# Patient Record
Sex: FEMALE | Race: WHITE | NOT HISPANIC OR LATINO
[De-identification: names, ages, dates, MRNs, and addresses within clinical notes are randomized per-mention and may not be internally consistent; named-entity substitution may affect disease eponyms.]

---

## 2020-09-17 ENCOUNTER — IMAGING SERVICES (OUTPATIENT)
Dept: OTHER | Age: 60
End: 2020-09-17

## 2021-09-13 ENCOUNTER — IMAGING SERVICES (OUTPATIENT)
Dept: OTHER | Age: 61
End: 2021-09-13

## 2021-12-21 ENCOUNTER — IMAGING SERVICES (OUTPATIENT)
Dept: OTHER | Age: 61
End: 2021-12-21

## 2022-02-02 ENCOUNTER — HOSPITAL ENCOUNTER (OUTPATIENT)
Dept: ULTRASOUND IMAGING | Age: 62
Discharge: HOME | End: 2022-02-02
Attending: OBSTETRICS & GYNECOLOGY

## 2022-02-02 ENCOUNTER — HOSPITAL ENCOUNTER (OUTPATIENT)
Dept: MAMMOGRAPHY | Age: 62
Discharge: HOME | End: 2022-02-02
Attending: OBSTETRICS & GYNECOLOGY

## 2022-02-02 DIAGNOSIS — N63.21 MASS OF UPPER OUTER QUADRANT OF LEFT BREAST: ICD-10-CM

## 2022-02-02 DIAGNOSIS — R59.9 ENLARGED LYMPH NODE: ICD-10-CM

## 2022-02-02 DIAGNOSIS — N63.11 MASS OF UPPER OUTER QUADRANT OF RIGHT BREAST: ICD-10-CM

## 2022-02-02 PROCEDURE — G0279 TOMOSYNTHESIS, MAMMO: HCPCS

## 2022-02-02 PROCEDURE — 76882 US LMTD JT/FCL EVL NVASC XTR: CPT

## 2023-01-27 ENCOUNTER — LAB REQUISITION (OUTPATIENT)
Dept: OBGYN | Age: 63
End: 2023-01-27

## 2023-01-27 DIAGNOSIS — R32 URINARY INCONTINENCE: ICD-10-CM

## 2023-01-27 PROCEDURE — 87088 URINE BACTERIA CULTURE: CPT | Performed by: CLINICAL MEDICAL LABORATORY

## 2023-01-27 PROCEDURE — 87086 URINE CULTURE/COLONY COUNT: CPT | Performed by: CLINICAL MEDICAL LABORATORY

## 2023-01-29 LAB — BACTERIA UR CULT: ABNORMAL

## 2023-02-03 ENCOUNTER — HOSPITAL ENCOUNTER (OUTPATIENT)
Dept: CT IMAGING | Age: 63
Discharge: HOME | End: 2023-02-03
Attending: OBSTETRICS & GYNECOLOGY

## 2023-02-03 DIAGNOSIS — Z12.31 VISIT FOR SCREENING MAMMOGRAM: ICD-10-CM

## 2023-02-03 PROCEDURE — 77063 BREAST TOMOSYNTHESIS BI: CPT

## 2023-02-27 ENCOUNTER — HOSPITAL ENCOUNTER (OUTPATIENT)
Dept: CT IMAGING | Age: 63
Discharge: HOME | End: 2023-02-27
Attending: OBSTETRICS & GYNECOLOGY

## 2023-02-27 DIAGNOSIS — Z12.31 ENCOUNTER FOR SCREENING MAMMOGRAM FOR MALIGNANT NEOPLASM OF BREAST: ICD-10-CM

## 2023-02-27 DIAGNOSIS — R92.2 DENSE BREASTS: ICD-10-CM

## 2023-02-27 DIAGNOSIS — R92.30 DENSE BREASTS: ICD-10-CM

## 2023-02-27 PROCEDURE — 76641 ULTRASOUND BREAST COMPLETE: CPT

## 2023-05-24 DIAGNOSIS — N63.0 LUMP OR MASS IN BREAST: Primary | ICD-10-CM

## 2023-06-14 ENCOUNTER — HOSPITAL ENCOUNTER (OUTPATIENT)
Dept: PHYSICAL MEDICINE AND REHAB | Age: 63
Discharge: STILL ADMITTED OR ADMINISTRATIVE | End: 2023-06-14

## 2023-06-14 DIAGNOSIS — R32 URINARY INCONTINENCE: Primary | ICD-10-CM

## 2023-06-14 PROCEDURE — 97140 MANUAL THERAPY 1/> REGIONS: CPT | Performed by: PHYSICAL THERAPIST

## 2023-06-14 PROCEDURE — 97535 SELF CARE MNGMENT TRAINING: CPT | Performed by: PHYSICAL THERAPIST

## 2023-06-14 PROCEDURE — 97161 PT EVAL LOW COMPLEX 20 MIN: CPT | Performed by: PHYSICAL THERAPIST

## 2023-06-20 ENCOUNTER — HOSPITAL ENCOUNTER (OUTPATIENT)
Dept: PHYSICAL MEDICINE AND REHAB | Age: 63
Discharge: STILL ADMITTED OR ADMINISTRATIVE | End: 2023-06-20

## 2023-06-20 PROCEDURE — 97112 NEUROMUSCULAR REEDUCATION: CPT | Performed by: PHYSICAL THERAPIST

## 2023-06-20 PROCEDURE — 97140 MANUAL THERAPY 1/> REGIONS: CPT | Performed by: PHYSICAL THERAPIST

## 2023-06-20 PROCEDURE — 97535 SELF CARE MNGMENT TRAINING: CPT | Performed by: PHYSICAL THERAPIST

## 2023-06-20 ASSESSMENT — ENCOUNTER SYMPTOMS: PAIN SEVERITY NOW: 2

## 2023-07-05 ENCOUNTER — HOSPITAL ENCOUNTER (OUTPATIENT)
Dept: PHYSICAL MEDICINE AND REHAB | Age: 63
Discharge: STILL ADMITTED OR ADMINISTRATIVE | End: 2023-07-05

## 2023-07-05 PROCEDURE — 97535 SELF CARE MNGMENT TRAINING: CPT | Performed by: PHYSICAL THERAPIST

## 2023-07-05 PROCEDURE — 97110 THERAPEUTIC EXERCISES: CPT | Performed by: PHYSICAL THERAPIST

## 2023-07-05 PROCEDURE — 97140 MANUAL THERAPY 1/> REGIONS: CPT | Performed by: PHYSICAL THERAPIST

## 2023-07-12 ENCOUNTER — HOSPITAL ENCOUNTER (OUTPATIENT)
Dept: PHYSICAL MEDICINE AND REHAB | Age: 63
Discharge: STILL ADMITTED OR ADMINISTRATIVE | End: 2023-07-12

## 2023-07-12 PROCEDURE — 97140 MANUAL THERAPY 1/> REGIONS: CPT | Performed by: PHYSICAL THERAPIST

## 2023-07-19 ENCOUNTER — HOSPITAL ENCOUNTER (OUTPATIENT)
Dept: PHYSICAL MEDICINE AND REHAB | Age: 63
Discharge: STILL ADMITTED OR ADMINISTRATIVE | End: 2023-07-19

## 2023-07-19 PROCEDURE — 97140 MANUAL THERAPY 1/> REGIONS: CPT | Performed by: PHYSICAL THERAPIST

## 2023-07-26 ENCOUNTER — APPOINTMENT (OUTPATIENT)
Dept: PHYSICAL MEDICINE AND REHAB | Age: 63
End: 2023-07-26

## 2023-08-02 ENCOUNTER — HOSPITAL ENCOUNTER (OUTPATIENT)
Dept: PHYSICAL MEDICINE AND REHAB | Age: 63
Discharge: STILL ADMITTED OR ADMINISTRATIVE | End: 2023-08-02

## 2023-08-02 PROCEDURE — 97110 THERAPEUTIC EXERCISES: CPT | Performed by: PHYSICAL THERAPIST

## 2023-08-02 PROCEDURE — 97112 NEUROMUSCULAR REEDUCATION: CPT | Performed by: PHYSICAL THERAPIST

## 2023-08-28 ENCOUNTER — HOSPITAL ENCOUNTER (OUTPATIENT)
Dept: CT IMAGING | Age: 63
Discharge: HOME | End: 2023-08-28
Attending: OBSTETRICS & GYNECOLOGY

## 2023-08-28 DIAGNOSIS — N63.0 LUMP OR MASS IN BREAST: ICD-10-CM

## 2023-08-28 PROCEDURE — 76642 ULTRASOUND BREAST LIMITED: CPT

## 2023-08-30 ENCOUNTER — APPOINTMENT (OUTPATIENT)
Dept: PHYSICAL MEDICINE AND REHAB | Age: 63
End: 2023-08-30

## 2023-12-06 RX ORDER — POLYETHYLENE GLYCOL-3350 AND ELECTROLYTES 236; 6.74; 5.86; 2.97; 22.74 G/274.31G; G/274.31G; G/274.31G; G/274.31G; G/274.31G
POWDER, FOR SOLUTION ORAL
COMMUNITY

## 2023-12-12 ENCOUNTER — ANESTHESIA (OUTPATIENT)
Dept: SURGERY | Facility: HOSPITAL | Age: 63
End: 2023-12-12
Payer: COMMERCIAL

## 2023-12-12 ENCOUNTER — EXTERNAL LAB (OUTPATIENT)
Dept: HEALTH INFORMATION MANAGEMENT | Facility: OTHER | Age: 63
End: 2023-12-12

## 2023-12-12 ENCOUNTER — ANESTHESIA EVENT (OUTPATIENT)
Dept: SURGERY | Facility: HOSPITAL | Age: 63
End: 2023-12-12
Payer: COMMERCIAL

## 2023-12-12 ENCOUNTER — HOSPITAL ENCOUNTER (OUTPATIENT)
Facility: HOSPITAL | Age: 63
Setting detail: HOSPITAL OUTPATIENT SURGERY
Discharge: HOME | End: 2023-12-12
Attending: SPECIALIST | Admitting: SPECIALIST
Payer: COMMERCIAL

## 2023-12-12 VITALS
DIASTOLIC BLOOD PRESSURE: 73 MMHG | WEIGHT: 142 LBS | BODY MASS INDEX: 21.52 KG/M2 | SYSTOLIC BLOOD PRESSURE: 131 MMHG | RESPIRATION RATE: 12 BRPM | TEMPERATURE: 98 F | HEART RATE: 91 BPM | OXYGEN SATURATION: 96 % | HEIGHT: 68 IN

## 2023-12-12 PROBLEM — N64.81 BREAST PTOSIS: Status: ACTIVE | Noted: 2023-12-12

## 2023-12-12 LAB — LAB RESULT: NORMAL

## 2023-12-12 PROCEDURE — 88305 TISSUE EXAM BY PATHOLOGIST: CPT | Performed by: SPECIALIST

## 2023-12-12 PROCEDURE — 0HQV0ZZ REPAIR BILATERAL BREAST, OPEN APPROACH: ICD-10-PCS | Performed by: SPECIALIST

## 2023-12-12 RX ORDER — ACETAMINOPHEN 325 MG/1
650 TABLET ORAL EVERY 4 HOURS PRN
Status: DISCONTINUED | OUTPATIENT
Start: 2023-12-12 | End: 2023-12-12

## 2023-12-12 RX ORDER — EPHEDRINE SULFATE 50 MG/ML
INJECTION, SOLUTION INTRAVENOUS AS NEEDED
Status: DISCONTINUED | OUTPATIENT
Start: 2023-12-12 | End: 2023-12-12 | Stop reason: SURG

## 2023-12-12 RX ORDER — ONDANSETRON 2 MG/ML
INJECTION INTRAMUSCULAR; INTRAVENOUS AS NEEDED
Status: DISCONTINUED | OUTPATIENT
Start: 2023-12-12 | End: 2023-12-12 | Stop reason: SURG

## 2023-12-12 RX ORDER — DIAZEPAM 5 MG/1
0.5 TABLET ORAL NIGHTLY PRN
COMMUNITY
Start: 2023-07-17

## 2023-12-12 RX ORDER — CEFAZOLIN SODIUM/WATER 2 G/20 ML
2 SYRINGE (ML) INTRAVENOUS ONCE
Status: COMPLETED | OUTPATIENT
Start: 2023-12-12 | End: 2023-12-12

## 2023-12-12 RX ORDER — SCOLOPAMINE TRANSDERMAL SYSTEM 1 MG/1
1 PATCH, EXTENDED RELEASE TRANSDERMAL ONCE
Status: DISCONTINUED | OUTPATIENT
Start: 2023-12-12 | End: 2023-12-12 | Stop reason: HOSPADM

## 2023-12-12 RX ORDER — ACETAMINOPHEN 500 MG
1000 TABLET ORAL ONCE
Status: COMPLETED | OUTPATIENT
Start: 2023-12-12 | End: 2023-12-12

## 2023-12-12 RX ORDER — HYDROCODONE BITARTRATE AND ACETAMINOPHEN 5; 325 MG/1; MG/1
2 TABLET ORAL EVERY 4 HOURS PRN
Status: DISCONTINUED | OUTPATIENT
Start: 2023-12-12 | End: 2023-12-12

## 2023-12-12 RX ORDER — BUPIVACAINE HYDROCHLORIDE 2.5 MG/ML
INJECTION, SOLUTION EPIDURAL; INFILTRATION; INTRACAUDAL AS NEEDED
Status: DISCONTINUED | OUTPATIENT
Start: 2023-12-12 | End: 2023-12-12 | Stop reason: HOSPADM

## 2023-12-12 RX ORDER — MORPHINE SULFATE 10 MG/ML
6 INJECTION, SOLUTION INTRAMUSCULAR; INTRAVENOUS EVERY 10 MIN PRN
Status: DISCONTINUED | OUTPATIENT
Start: 2023-12-12 | End: 2023-12-12

## 2023-12-12 RX ORDER — ONDANSETRON 2 MG/ML
4 INJECTION INTRAMUSCULAR; INTRAVENOUS EVERY 6 HOURS PRN
Status: DISCONTINUED | OUTPATIENT
Start: 2023-12-12 | End: 2023-12-12

## 2023-12-12 RX ORDER — MORPHINE SULFATE 4 MG/ML
4 INJECTION, SOLUTION INTRAMUSCULAR; INTRAVENOUS EVERY 10 MIN PRN
Status: DISCONTINUED | OUTPATIENT
Start: 2023-12-12 | End: 2023-12-12

## 2023-12-12 RX ORDER — NALOXONE HYDROCHLORIDE 0.4 MG/ML
0.08 INJECTION, SOLUTION INTRAMUSCULAR; INTRAVENOUS; SUBCUTANEOUS AS NEEDED
Status: DISCONTINUED | OUTPATIENT
Start: 2023-12-12 | End: 2023-12-12

## 2023-12-12 RX ORDER — HYDROCODONE BITARTRATE AND ACETAMINOPHEN 5; 325 MG/1; MG/1
1 TABLET ORAL EVERY 4 HOURS PRN
Status: DISCONTINUED | OUTPATIENT
Start: 2023-12-12 | End: 2023-12-12

## 2023-12-12 RX ORDER — MIDAZOLAM HYDROCHLORIDE 1 MG/ML
INJECTION INTRAMUSCULAR; INTRAVENOUS AS NEEDED
Status: DISCONTINUED | OUTPATIENT
Start: 2023-12-12 | End: 2023-12-12 | Stop reason: SURG

## 2023-12-12 RX ORDER — HYDROMORPHONE HYDROCHLORIDE 1 MG/ML
0.6 INJECTION, SOLUTION INTRAMUSCULAR; INTRAVENOUS; SUBCUTANEOUS EVERY 5 MIN PRN
Status: DISCONTINUED | OUTPATIENT
Start: 2023-12-12 | End: 2023-12-12

## 2023-12-12 RX ORDER — HYDROMORPHONE HYDROCHLORIDE 1 MG/ML
0.4 INJECTION, SOLUTION INTRAMUSCULAR; INTRAVENOUS; SUBCUTANEOUS EVERY 5 MIN PRN
Status: DISCONTINUED | OUTPATIENT
Start: 2023-12-12 | End: 2023-12-12

## 2023-12-12 RX ORDER — HYDROMORPHONE HYDROCHLORIDE 1 MG/ML
0.2 INJECTION, SOLUTION INTRAMUSCULAR; INTRAVENOUS; SUBCUTANEOUS EVERY 5 MIN PRN
Status: DISCONTINUED | OUTPATIENT
Start: 2023-12-12 | End: 2023-12-12

## 2023-12-12 RX ORDER — NEOSTIGMINE METHYLSULFATE 1 MG/ML
INJECTION, SOLUTION INTRAVENOUS AS NEEDED
Status: DISCONTINUED | OUTPATIENT
Start: 2023-12-12 | End: 2023-12-12 | Stop reason: SURG

## 2023-12-12 RX ORDER — MORPHINE SULFATE 4 MG/ML
2 INJECTION, SOLUTION INTRAMUSCULAR; INTRAVENOUS EVERY 10 MIN PRN
Status: DISCONTINUED | OUTPATIENT
Start: 2023-12-12 | End: 2023-12-12

## 2023-12-12 RX ORDER — DEXAMETHASONE SODIUM PHOSPHATE 4 MG/ML
VIAL (ML) INJECTION AS NEEDED
Status: DISCONTINUED | OUTPATIENT
Start: 2023-12-12 | End: 2023-12-12 | Stop reason: SURG

## 2023-12-12 RX ORDER — SODIUM CHLORIDE, SODIUM LACTATE, POTASSIUM CHLORIDE, CALCIUM CHLORIDE 600; 310; 30; 20 MG/100ML; MG/100ML; MG/100ML; MG/100ML
INJECTION, SOLUTION INTRAVENOUS CONTINUOUS
Status: DISCONTINUED | OUTPATIENT
Start: 2023-12-12 | End: 2023-12-12

## 2023-12-12 RX ORDER — GLYCOPYRROLATE 0.2 MG/ML
INJECTION, SOLUTION INTRAMUSCULAR; INTRAVENOUS AS NEEDED
Status: DISCONTINUED | OUTPATIENT
Start: 2023-12-12 | End: 2023-12-12 | Stop reason: SURG

## 2023-12-12 RX ORDER — PROCHLORPERAZINE EDISYLATE 5 MG/ML
5 INJECTION INTRAMUSCULAR; INTRAVENOUS EVERY 8 HOURS PRN
Status: DISCONTINUED | OUTPATIENT
Start: 2023-12-12 | End: 2023-12-12

## 2023-12-12 RX ORDER — LIDOCAINE HYDROCHLORIDE 10 MG/ML
INJECTION, SOLUTION EPIDURAL; INFILTRATION; INTRACAUDAL; PERINEURAL AS NEEDED
Status: DISCONTINUED | OUTPATIENT
Start: 2023-12-12 | End: 2023-12-12 | Stop reason: SURG

## 2023-12-12 RX ORDER — ROCURONIUM BROMIDE 10 MG/ML
INJECTION, SOLUTION INTRAVENOUS AS NEEDED
Status: DISCONTINUED | OUTPATIENT
Start: 2023-12-12 | End: 2023-12-12 | Stop reason: SURG

## 2023-12-12 RX ADMIN — NEOSTIGMINE METHYLSULFATE 5 MG: 1 INJECTION, SOLUTION INTRAVENOUS at 13:31:00

## 2023-12-12 RX ADMIN — SODIUM CHLORIDE, SODIUM LACTATE, POTASSIUM CHLORIDE, CALCIUM CHLORIDE: 600; 310; 30; 20 INJECTION, SOLUTION INTRAVENOUS at 15:17:00

## 2023-12-12 RX ADMIN — LIDOCAINE HYDROCHLORIDE 50 MG: 10 INJECTION, SOLUTION EPIDURAL; INFILTRATION; INTRACAUDAL; PERINEURAL at 13:25:00

## 2023-12-12 RX ADMIN — EPHEDRINE SULFATE 5 MG: 50 INJECTION, SOLUTION INTRAVENOUS at 14:41:00

## 2023-12-12 RX ADMIN — CEFAZOLIN SODIUM/WATER 2 G: 2 G/20 ML SYRINGE (ML) INTRAVENOUS at 13:31:00

## 2023-12-12 RX ADMIN — SODIUM CHLORIDE, SODIUM LACTATE, POTASSIUM CHLORIDE, CALCIUM CHLORIDE: 600; 310; 30; 20 INJECTION, SOLUTION INTRAVENOUS at 16:12:00

## 2023-12-12 RX ADMIN — ROCURONIUM BROMIDE 50 MG: 10 INJECTION, SOLUTION INTRAVENOUS at 13:27:00

## 2023-12-12 RX ADMIN — ONDANSETRON 4 MG: 2 INJECTION INTRAMUSCULAR; INTRAVENOUS at 13:27:00

## 2023-12-12 RX ADMIN — DEXAMETHASONE SODIUM PHOSPHATE 8 MG: 4 MG/ML VIAL (ML) INJECTION at 13:27:00

## 2023-12-12 RX ADMIN — GLYCOPYRROLATE 1 MG: 0.2 INJECTION, SOLUTION INTRAMUSCULAR; INTRAVENOUS at 13:31:00

## 2023-12-12 RX ADMIN — EPHEDRINE SULFATE 5 MG: 50 INJECTION, SOLUTION INTRAVENOUS at 14:43:00

## 2023-12-12 RX ADMIN — MIDAZOLAM HYDROCHLORIDE 2 MG: 1 INJECTION INTRAMUSCULAR; INTRAVENOUS at 13:20:00

## 2023-12-12 NOTE — OPERATIVE REPORT
[facility]    PATIENT'S NAME: Corrinne Sails   ATTENDING PHYSICIAN: Anisa Barclay MD   OPERATING PHYSICIAN: Anisa Barclay MD   PATIENT ACCOUNT#:   [id number]    LOCATION:  [facility] [address] PACU 2 EMHP 10  MEDICAL RECORD #:   [id number]       YOB: 1960  ADMISSION DATE:       12/12/2023      OPERATION DATE:  12/12/2023    OPERATIVE REPORT      PREOPERATIVE DIAGNOSIS:  Bilateral breast ptosis and macromastia. POSTOPERATIVE DIAGNOSIS:  Bilateral breast ptosis and macromastia. PROCEDURE:  Bilateral breast lift with small reduction. ASSISTANT:  Jerry Weaver CSA     ANESTHESIA:  General endotracheal anesthesia. ESTIMATED BLOOD LOSS:  50 mL. INTRAVENOUS FLUIDS:  1500 mL of crystalloid. COMPLICATIONS:  None. DRAINS PLACED:  One 7 mm flat ALAN drain in each lateral breast pocket and 1 On-Q pain pump catheter in each breast pocket for postop pain control. DISPOSITION:  Patient tolerated the procedure well, was awakened from anesthesia, and transferred to the recovery room in good condition. INDICATIONS:  This is a 67-year-old patient who presented to my office with complaints of bilateral breast ptosis and macromastia. She requested a bilateral breast lift and small reduction. We discussed the operation in detail at 2 separate preoperative visits.   She was made aware of the risks of bleeding; infection; scarring; asymmetry; contour irregularities; wound dehiscence with the need for secondary wound healing; skin flap necrosis with the need for secondary wound healing; nipple necrosis with the need for secondary wound healing; skin flap sensory loss that can be permanent in nature; nipple-areolar complex sensory loss that can be permanent nature; no guarantee on final breast cup size; the use of ALAN drains and pain pumps with the operation; need for revisional surgery; overall dissatisfaction with the final aesthetic outcome; and the rare but possible risk of DVT, PE, MI, COVID-19 infection, sepsis and death. Understanding these risks and limitations, she wished to proceed. I answered all her questions in detail at the 2 separate preoperative visits and again in the preop holding area, and once all questions were answered and consents obtained, the procedure went as follows. OPERATIVE TECHNIQUE:  The patient was taken to the operating room, placed on the table in a supine position. General endotracheal anesthesia was induced without any complications. The anterior chest wall area was prepped and draped in normal sterile fashion. At the beginning of the procedure, approximately 150 mL of tumescent solution was infiltrated throughout the periphery of breast pedicle on each side. At this point, the 42 mm cookie cutter was used to imprint the nipple-areolar complex on each side, and the skin was de-epithelialized between the edge of the nipple-areolar complex and the edge of the pedicle on each side. At this point, the incisions on the periphery of the pedicles were deepened with a 10 blade and taken to a distance of approximately 1.5 cm. The skin flaps were elevated off the underlying breast glands, keeping a thickness of 1.5 cm, first medially, then superiorly, and then laterally. At this point, the breast glands were debulked as per the patient's request for a small reduction, and then the skin flaps were brought down over the reduced glands, secured at a point 10 cm off the midline along each inframammary fold. The medial and lateral dog ears were marked, incised sharply, and removed. The skin flaps were reopened. Bipolar was used for hemostasis throughout. The areas were irrigated with warm antibiotic irrigation as well as with Betadine, again examined for bleeding, and bipolar was again used for hemostasis throughout.   The pedicles were tacked to the anterior chest wall with 2 interrupted sutures of 2-0 PDS, 1 medial, 1 lateral, and then stab incisions were made laterally for placement of the 7 mm flat ALAN drains and the On-Q pain pump catheters were placed in the pocket as well. At this point, the skin flaps were brought down and sewn in place with 2-0 Vicryl and 4-0 Prolene. The patient was seated in the upright sitting position and found to have excellent breast shape and symmetry. The new positions for the nipple-areolar complexes were marked with the 38 mm cookie cutters and measured for symmetry from both the sternal notch and midline. Once symmetry was ensured, the markings were incised. The skin and underlying subcutaneous tissue was removed sharply. Bipolar was used for hemostasis along the fresh incisional edge, and then the original nipple-areolar complexes were brought out and sewn in place with 3-0 Vicryl. At this point, the right nipple-areolar complex was found to have significant venous congestion. The sutures were removed from the periareolar area and the PDS sutures were released bilaterally, and the pedicle was irrigated with warm antibiotic irrigation for approximately 10 minutes. The nipple was again examined and found to have an improvement in the venous congestion with some evidence of capillary refill. At this point, the left nipple-areolar complex was closed with Prolene suture in a subcuticular fashion and the right nipple-areolar complex was sutured with 4 interrupted 3-0 Vicryl sutures, and then a loose baseball stitch of 4-0 Prolene was used on the edge to reapproximate the nipple-areolar complexes to the skin edge. The nipple then still had evidence of venous congestion, but there was capillary refill. The patient was awakened with continued observation of the nipple-areolar complex. The nipple complex became a darker red color consistent with vascular inflow and some degree of vascular outflow.   At this point, an additional 10 minutes was spent watching the nipple-areolar complex and there was no change of the nipple-areolar complex to a more purplish venous congestion, so the decision was made to continue to watch the nipple-areolar complex in hopes of it regaining its full vascularity. The patient was then fully awakened from anesthesia and transferred to the recovery room in good condition. At that point, Mastisol and Steri-Strips were applied along the incision line. Mastisol, Steri-Strips, Biopatches, and Tegaderms were used to secure the ALAN drains and pain pumps. The pain pumps were bolused with 10 mL of 0.5% Marcaine on each side, and the drains were placed on bulb suction. The patient was then placed into a loose-fitting support bra and transferred to the recovery room in good condition. She was checked in the recovery room again and found to have a dark red appearance of the right nipple-areolar complex with no evidence of purple venous congestion. A rapid capillary refill was present for the right nipple-areolar complex. The left nipple-areolar complex had normal capillary refill and normal color throughout the procedure. The patient will be observed for a few more hours in the recovery room to ensure that the nipple-areolar complex has continued capillary refill before sending her home, and I will see her back in the office in 2 days for ALAN drain and pain pump placement. Dictated By Venessa Barclay MD  d: 12/12/2023 16:22:35  t: 12/12/2023 16:43:43  [facility] [id number]  [facility]/

## 2023-12-12 NOTE — H&P
History & Physical Examination    Patient Name: Augusto Lanza  MRN: [id number]  CSN: [id number]  YOB: 1960    Diagnosis: Bilateral breast ptosis    Present Illness: 59 yo presents with complaints of bilateral breast ptosis and requests a bilateral breast lift. All risks and benefits and alternatives to surgery discussed and questions answered in detail and patient consents to proceed. Medications Prior to Admission   Medication Sig Dispense Refill Last Dose    diazePAM 5 MG Oral Tab Take 0.5 tablets (2.5 mg total) by mouth nightly as needed. 2023 at 0800    polyethylene glycol, PEG 3350-KCl-NaBcb-NaCl-NaSulf, (GAVILYTE-G) 236 g Oral Recon Soln        cholecalciferol 125 MCG (5000 UT) Oral Tab Take 1 capsule by mouth daily. 2023 at 0800    Estrogens Conjugated (PREMARIN) 0.625 MG Oral Tab Take 1 tablet (0.625 mg total) by mouth daily. 2023 at 0800     Current Facility-Administered Medications   Medication Dose Route Frequency    lactated ringers infusion   Intravenous Continuous    ceFAZolin (Ancef) 2 g in 20mL IV syringe premix  2 g Intravenous Once       Allergies:    Allergies   Allergen Reactions    Levaquin [Levofloxacin] ITCHING and SWELLING    Shellfish-Derived Products ITCHING       Past Medical History:   Diagnosis Date    Anxiety state     Back problem     Cataract     left    Depression     Hepatitis C     LBBB (left bundle branch block)     Osteopenia     Pneumonia due to organism     PONV (postoperative nausea and vomiting)     Visual impairment     glasses     Past Surgical History:   Procedure Laterality Date    BACK SURGERY      herniated lumbar disc    CHOLECYSTECTOMY      FOOT SURGERY Left     HC  SECTION LEVEL I       Family History   Problem Relation Age of Onset    Other (oral cancer) Mother     Heart Attack Father         CAD 60,  diet [de-identified] mesothelioma     Social History     Tobacco Use    Smoking status: Never    Smokeless tobacco: Not on file Substance Use Topics    Alcohol use: Not Currently     Comment: rarely       SYSTEM Check if Review is Normal Check if Physical Exam is Abnormal If not normal, please explain:   MAIR[place] [ x] [ ]    [place] [ x] [ ]    HEART [ x] [ ]    LUNGS [ x] [ ]    Jackie Jazmin [ x] [ ]    Maxine Mars [ x] [ ]    EXTREMITIES [ x] [ ]    Cristy Diamond [ ] [ x] Bilateral breast ptosis   OTHER        [ x ] I have discussed the risks and benefits and alternatives with the patient/family. [ x ] The above referenced H&P was reviewed by Bety Locke MD on 12/12/2023 the patient was examined and no significant changes have occurred in the patient's condition since the H&P was performed. I discussed with the patient and/or legal representative the potential benefits, risks and side effects of this procedure; the likelihood of the patient achieving goals; and potential problems that might occur during recuperation. I discussed reasonable alternatives to the procedure, including risks, benefits and side effects related to the alternatives and risks related to not receiving this procedure. We will proceed with procedure as planned. All questions have been answered in detail and they understand and agree to proceed with plan of care. [ x ] I have reviewed the History and Physical done within the last 30 days. Any changes noted above.     Nicolasa Bone MD  12/12/2023  12:00 PM

## 2023-12-12 NOTE — ANESTHESIA PROCEDURE NOTES
Airway  Date/Time: 12/12/2023 1:28 PM  Urgency: Elective    Airway not difficult    General Information and Staff    Patient location during procedure: OR  Anesthesiologist: Parul Burt MD  Resident/CRNA: Maddi Russo CRNA  Performed: CRNA   Performed by: Maddi Russo CRNA  Authorized by: Parul Burt MD      Indications and Patient Condition  Indications for airway management: anesthesia  Sedation level: deep  Preoxygenated: yes  Patient position: sniffing  Mask difficulty assessment: 2 - vent by mask + OA or adjuvant +/- NMBA    Final Airway Details  Final airway type: endotracheal airway      Successful airway: ETT  Cuffed: yes   Successful intubation technique: direct laryngoscopy  Facilitating devices/methods: intubating stylet  Endotracheal tube insertion site: oral  Blade: Fatmata  Blade size: #3  ETT size (mm): 7.0    Cormack-Lehane Classification: grade I - full view of glottis  Placement verified by: capnometry   Measured from: teeth  ETT to teeth (cm): 20  Number of attempts at approach: 1

## 2023-12-12 NOTE — BRIEF OP NOTE
Pre-Operative Diagnosis: Dissatisfaction of breast appearance     Post-Operative Diagnosis: Dissatisfaction of breast appearance     Procedure Performed:   Bilateral breast lift/reduction with Omid drain and pain pump placement    Surgeon(s) and Role:     Deborah Bloom MD - Primary    Assistant(s):  Surgical Assistant.: Neha Daniel     Surgical Findings: Right NAC delayed capillary refill     Specimen: R and L breast tissue     Estimated Blood Loss: Blood Output: 50 mL (12/12/2023  3:42 PM)      Dictation Number:  Dictated    Zenaida Manzano MD  12/12/2023  4:06 PM

## 2023-12-14 ENCOUNTER — HBO TECH VISIT (OUTPATIENT)
Dept: WOUND CARE | Facility: HOSPITAL | Age: 63
End: 2023-12-14
Attending: FAMILY MEDICINE
Payer: COMMERCIAL

## 2023-12-14 VITALS
DIASTOLIC BLOOD PRESSURE: 78 MMHG | TEMPERATURE: 98 F | SYSTOLIC BLOOD PRESSURE: 123 MMHG | RESPIRATION RATE: 16 BRPM | HEART RATE: 106 BPM

## 2023-12-14 VITALS
RESPIRATION RATE: 12 BRPM | DIASTOLIC BLOOD PRESSURE: 78 MMHG | HEART RATE: 84 BPM | SYSTOLIC BLOOD PRESSURE: 146 MMHG | TEMPERATURE: 97 F

## 2023-12-14 DIAGNOSIS — T86.828 NECROSIS OF FLAP (HCC): Primary | ICD-10-CM

## 2023-12-14 DIAGNOSIS — I96 NECROSIS OF FLAP (HCC): Primary | ICD-10-CM

## 2023-12-14 LAB
GLUCOSE BLD-MCNC: 112 MG/DL (ref 70–99)
GLUCOSE BLD-MCNC: 127 MG/DL (ref 70–99)

## 2023-12-14 PROCEDURE — 99215 OFFICE O/P EST HI 40 MIN: CPT | Performed by: FAMILY MEDICINE

## 2023-12-14 RX ORDER — HYDROCODONE BITARTRATE AND ACETAMINOPHEN 5; 325 MG/1; MG/1
1 TABLET ORAL EVERY 6 HOURS PRN
COMMUNITY

## 2023-12-14 RX ORDER — GARLIC EXTRACT 500 MG
1 CAPSULE ORAL DAILY
COMMUNITY

## 2023-12-14 NOTE — PROGRESS NOTES
.Weekly Wound Education Note    Teaching Provided To: Patient  Training Topics: HBO  Training Method: Demonstration;Explain/Verbal  Training Response: Reinforcement needed        Notes: Pt here for inital HBO consult to breast wounds. Pt had procedure completed 12/12/23. Provider educated patient of benefits and risks to HBO therapy. Pt recieved HBO tech consult at visit as well. Patient having slight/scant bloody drainage, provider recommending xeroform folded dressing to areola covered with 4x4 dry gauze.

## 2023-12-15 ENCOUNTER — HBO THERAPY VISIT (OUTPATIENT)
Dept: WOUND CARE | Facility: HOSPITAL | Age: 63
End: 2023-12-15
Attending: FAMILY MEDICINE
Payer: COMMERCIAL

## 2023-12-15 VITALS
DIASTOLIC BLOOD PRESSURE: 83 MMHG | HEART RATE: 87 BPM | SYSTOLIC BLOOD PRESSURE: 140 MMHG | TEMPERATURE: 98 F | RESPIRATION RATE: 16 BRPM

## 2023-12-15 NOTE — PROGRESS NOTES
Pre/Post Treatment Evaluation      Pre-Treatment Lung Evaluation: Clear to auscultation bilaterally    Left Ear Pre-Treatment Evaluation  Left Ear Clear: Yes  Left Ear Intact: Yes  Left Cerumen Removal: No  Pre Left teed stgstrstastdstest:st st1st grade    Right Ear Pre-Treatment Evaluation  Right Ear Clear: Yes  Right Ear Intact: Yes  Right Cerumen Removal: No  Pre Right teed stgstrstastdstest:st st1st grade      Post-Treatment Lung Evaluation: Clear to auscultation bilaterally    Left Ear Post-Treatment Evaluation  Left Ear Clear: Yes  Left Ear Intact: Yes  Left Cerumen Removal: No  Post Left teed stgstrstastdstest:st st1st grade    Right Ear Post-Treatment Evaluation  Right Ear Clear: Yes  Right Ear Intact: Yes  Right Cerumen Removal: No  Post Right teed stgstrstastdstest:st st1st grade      I supervised this Hyperbaric treatment and was immediately available throughout the course of the treatment. There is a trained emergency support team and ICU available at this facility to assist with complications.     Anita Sotomayor MD, 12/15/23, 1:40 PM

## 2023-12-16 ENCOUNTER — HBO THERAPY VISIT (OUTPATIENT)
Dept: WOUND CARE | Facility: HOSPITAL | Age: 63
End: 2023-12-16
Attending: FAMILY MEDICINE
Payer: COMMERCIAL

## 2023-12-16 VITALS
SYSTOLIC BLOOD PRESSURE: 117 MMHG | HEART RATE: 96 BPM | DIASTOLIC BLOOD PRESSURE: 72 MMHG | TEMPERATURE: 98 F | RESPIRATION RATE: 12 BRPM

## 2023-12-16 NOTE — PROGRESS NOTES
Pre/Post Treatment Evaluation      Pre-Treatment Lung Evaluation: Clear to auscultation bilaterally    Left Ear Pre-Treatment Evaluation  Left Ear Clear: Yes  Left Ear Intact: Yes  Left Cerumen Removal: No  Pre Left teed stgstrstastdstest:st st1st grade    Right Ear Pre-Treatment Evaluation  Right Ear Clear: Yes  Right Ear Intact: Yes  Right Cerumen Removal: No  Pre Right teed stgstrstastdstest:st st1st grade      Post-Treatment Lung Evaluation: Clear to auscultation bilaterally    Left Ear Post-Treatment Evaluation  Left Ear Clear: Yes  Left Ear Intact: Yes  Left Cerumen Removal: No  Post Left teed stgstrstastdstest:st st1st grade    Right Ear Post-Treatment Evaluation  Right Ear Clear: Yes  Right Ear Intact: Yes  Right Cerumen Removal: No  Post Right teed stgstrstastdstest:st st1st grade      I supervised this Hyperbaric treatment and was immediately available throughout the course of the treatment. There is a trained emergency support team and ICU available at this facility to assist with complications. Pt c/o nausea multiple times - asking for air break sooner    She has been taking valium prior to procedure. Recommend decrease to 2.0 JOHNNIE pressure to avoid complications of oxygen toxicity. Offered anti nausea med s- pt wants to hold off at this point of time.      Charu Car MD, 12/16/23

## 2023-12-17 ENCOUNTER — HBO THERAPY VISIT (OUTPATIENT)
Dept: WOUND CARE | Facility: HOSPITAL | Age: 63
End: 2023-12-17
Attending: FAMILY MEDICINE
Payer: COMMERCIAL

## 2023-12-17 VITALS
HEART RATE: 84 BPM | DIASTOLIC BLOOD PRESSURE: 74 MMHG | RESPIRATION RATE: 12 BRPM | SYSTOLIC BLOOD PRESSURE: 113 MMHG | TEMPERATURE: 97 F

## 2023-12-18 ENCOUNTER — HBO THERAPY VISIT (OUTPATIENT)
Dept: WOUND CARE | Facility: HOSPITAL | Age: 63
End: 2023-12-18
Attending: INTERNAL MEDICINE
Payer: COMMERCIAL

## 2023-12-18 VITALS
TEMPERATURE: 98 F | RESPIRATION RATE: 16 BRPM | DIASTOLIC BLOOD PRESSURE: 78 MMHG | HEART RATE: 86 BPM | SYSTOLIC BLOOD PRESSURE: 125 MMHG

## 2023-12-18 LAB — GLUCOSE BLD-MCNC: 89 MG/DL (ref 70–99)

## 2023-12-18 PROCEDURE — 99183 HYPERBARIC OXYGEN THERAPY: CPT | Performed by: INTERNAL MEDICINE

## 2023-12-18 NOTE — PROGRESS NOTES
Pre/Post Treatment Evaluation      Pre-Treatment Lung Evaluation: Clear to auscultation bilaterally    Left Ear Pre-Treatment Evaluation  Left Ear Clear: Yes  Left Ear Intact: Yes  Left Cerumen Removal: No  Pre Left teed stgstrstastdstest:st st1st grade    Right Ear Pre-Treatment Evaluation  Right Ear Clear: Yes  Right Ear Intact: Yes  Right Cerumen Removal: No  Pre Right teed stgstrstastdstest:st st1st grade      Post-Treatment Lung Evaluation: Clear to auscultation bilaterally    Left Ear Post-Treatment Evaluation  Left Ear Clear: Yes  Left Ear Intact: Yes  Left Cerumen Removal: No  Post Left teed stgstrstastdstest:st st1st grade    Right Ear Post-Treatment Evaluation  Right Ear Clear: Yes  Right Ear Intact: Yes  Right Cerumen Removal: No  Post Right teed stgstrstastdstest:st st1st grade      I supervised this Hyperbaric treatment and was immediately available throughout the course of the treatment. There is a trained emergency support team and ICU available at this facility to assist with complications. Please refer to HBO tech's note for procedure details and pre and post vitals.     Jesika Jay MD

## 2023-12-19 ENCOUNTER — HBO THERAPY VISIT (OUTPATIENT)
Dept: WOUND CARE | Facility: HOSPITAL | Age: 63
End: 2023-12-19
Attending: INTERNAL MEDICINE
Payer: COMMERCIAL

## 2023-12-19 VITALS
RESPIRATION RATE: 16 BRPM | DIASTOLIC BLOOD PRESSURE: 75 MMHG | SYSTOLIC BLOOD PRESSURE: 119 MMHG | TEMPERATURE: 97 F | HEART RATE: 83 BPM

## 2023-12-20 ENCOUNTER — APPOINTMENT (OUTPATIENT)
Dept: WOUND CARE | Facility: HOSPITAL | Age: 63
End: 2023-12-20
Attending: INTERNAL MEDICINE
Payer: COMMERCIAL

## 2023-12-21 ENCOUNTER — HBO THERAPY VISIT (OUTPATIENT)
Dept: WOUND CARE | Facility: HOSPITAL | Age: 63
End: 2023-12-21
Attending: INTERNAL MEDICINE
Payer: COMMERCIAL

## 2023-12-21 VITALS
HEART RATE: 87 BPM | RESPIRATION RATE: 16 BRPM | TEMPERATURE: 98 F | SYSTOLIC BLOOD PRESSURE: 119 MMHG | DIASTOLIC BLOOD PRESSURE: 77 MMHG

## 2023-12-21 VITALS
SYSTOLIC BLOOD PRESSURE: 125 MMHG | RESPIRATION RATE: 10 BRPM | DIASTOLIC BLOOD PRESSURE: 76 MMHG | TEMPERATURE: 98 F | HEART RATE: 81 BPM

## 2023-12-21 DIAGNOSIS — I96 NECROSIS OF FLAP (HCC): Primary | ICD-10-CM

## 2023-12-21 DIAGNOSIS — T86.828 NECROSIS OF FLAP (HCC): Primary | ICD-10-CM

## 2023-12-21 PROCEDURE — 99214 OFFICE O/P EST MOD 30 MIN: CPT | Performed by: FAMILY MEDICINE

## 2023-12-21 NOTE — PROGRESS NOTES
.Weekly Wound Education Note    Teaching Provided To: Patient  Training Topics: Discharge instructions;Cleasing and general instructions;Dressing  Training Method: Demonstration;Explain/Verbal  Training Response: Reinforcement needed        Notes: Pt here for follow up wound care visit to right breast. Per provider patient does not need any dressing to wound area at this time. Pt to continue with HBO therapy treatment. Pt to follow up next week with provider.

## 2023-12-22 ENCOUNTER — APPOINTMENT (OUTPATIENT)
Dept: WOUND CARE | Facility: HOSPITAL | Age: 63
End: 2023-12-22
Attending: INTERNAL MEDICINE
Payer: COMMERCIAL

## 2023-12-26 ENCOUNTER — HBO THERAPY VISIT (OUTPATIENT)
Dept: WOUND CARE | Facility: HOSPITAL | Age: 63
End: 2023-12-26
Attending: INTERNAL MEDICINE
Payer: COMMERCIAL

## 2023-12-26 PROCEDURE — 99183 HYPERBARIC OXYGEN THERAPY: CPT | Performed by: HOSPITALIST

## 2023-12-27 ENCOUNTER — HBO THERAPY VISIT (OUTPATIENT)
Dept: WOUND CARE | Facility: HOSPITAL | Age: 63
End: 2023-12-27
Attending: INTERNAL MEDICINE
Payer: COMMERCIAL

## 2023-12-27 VITALS
TEMPERATURE: 97 F | HEART RATE: 91 BPM | RESPIRATION RATE: 18 BRPM | SYSTOLIC BLOOD PRESSURE: 118 MMHG | DIASTOLIC BLOOD PRESSURE: 76 MMHG

## 2023-12-27 VITALS
SYSTOLIC BLOOD PRESSURE: 116 MMHG | TEMPERATURE: 98 F | RESPIRATION RATE: 10 BRPM | HEART RATE: 88 BPM | DIASTOLIC BLOOD PRESSURE: 84 MMHG

## 2023-12-27 VITALS
RESPIRATION RATE: 10 BRPM | DIASTOLIC BLOOD PRESSURE: 79 MMHG | SYSTOLIC BLOOD PRESSURE: 117 MMHG | TEMPERATURE: 99 F | HEART RATE: 82 BPM

## 2023-12-27 DIAGNOSIS — I96 NECROSIS OF FLAP (HCC): Primary | ICD-10-CM

## 2023-12-27 DIAGNOSIS — S21.002D OPEN WOUND OF LEFT BREAST, SUBSEQUENT ENCOUNTER: ICD-10-CM

## 2023-12-27 DIAGNOSIS — T86.828 NECROSIS OF FLAP (HCC): Primary | ICD-10-CM

## 2023-12-27 DIAGNOSIS — S21.001D OPEN WOUND OF RIGHT BREAST, SUBSEQUENT ENCOUNTER: ICD-10-CM

## 2023-12-27 PROCEDURE — 99213 OFFICE O/P EST LOW 20 MIN: CPT

## 2023-12-27 NOTE — PROGRESS NOTES
Weekly Wound Education Note    Teaching Provided To: Patient  Training Topics: Cleasing and general instructions; Compression; Discharge instructions;Dressing;Edema control     Training Response: Patient responds and understands; Reinforcement needed        Notes: Wound stable. xeroform, dry dressing. Bordered gauze applied before HBOT, pt to apply xeroform after treatment.

## 2023-12-27 NOTE — PROGRESS NOTES
Pre/Post Treatment Evaluation      Pre-Treatment Lung Evaluation: Clear to auscultation bilaterally    Left Ear Pre-Treatment Evaluation  Left Ear Clear: Yes  Left Ear Intact: Yes  Left Cerumen Removal: No  Pre Left teed stgstrstastdstest:st st1st grade    Right Ear Pre-Treatment Evaluation  Right Ear Clear: Yes  Right Ear Intact: Yes  Right Cerumen Removal: No  Pre Right teed stgstrstastdstest:st st1st grade      Post-Treatment Lung Evaluation: Clear to auscultation bilaterally    Left Ear Post-Treatment Evaluation  Left Ear Clear: Yes  Left Ear Intact: Yes  Left Cerumen Removal: No  Post Left teed stgstrstastdstest:st st1st grade    Right Ear Post-Treatment Evaluation  Right Ear Clear: Yes  Right Ear Intact: Yes  Right Cerumen Removal: No  Post Right teed stgstrstastdstest:st st1st grade      I supervised this Hyperbaric treatment and was immediately available throughout the course of the treatment. There is a trained emergency support team and ICU available at this facility to assist with complications.     Dre Zimmerman MD, 12/27/23, 11:30 AM

## 2023-12-27 NOTE — PATIENT INSTRUCTIONS
Wound Cleaning and Dressings:    Wash your hands with soap and water. Always wear gloves while changing dressings. Donot touch wound / andie-wound skin with un-gloved hands. Remove old dressing, discard and place into trash. DRESSINGS: xeroform / gauze  Change dressing daily. Miscellaneous Instructions:  Supplement with a daily multivitamin   Increase protein intake / consider protein supplements - see below    DIETARY MODIFICATIONS TO HELP WITH WOUND HEALING:    Protein: Meats, beans, eggs, milk and yogurt particularly [place] yogurt), tofu, soy nuts, soy protein products    Vitamin C: Citrus fruits and juices, strawberries, tomatoes, tomato juice, peppers, baked potatoes, spinach, broccoli, cauliflower, [place] sprouts, cabbage    Vitamin A: Dark green, leafy vegetables, orange or yellow vegetables, cantaloupe, fortified dairy products, liver, fortified cereals    Zinc: Fortified cereals, red meats, seafood    Consider Ran by [employer] (These are essential branch chain amino acids that help with tissue building and wound healing) and take 2 packets/day. you can order online at [employer] or [address] [place] [address] Drive REMINDERS:    The treatment plan has been discussed at length with you and your provider. Follow all instructions carefully, it is very important. If you do not follow all instructions, you are at  risk of your wound not healing, infection, possible loss of limb and even end of life. Please call the clinic during regular business hours ( 7:30 AM - 5:30 PM) if you notice increased bleeding, redness, warmth, pain or pus like drainage or start running a fever greater than 100.3. For after hour emergencies, please call your primary physician or go to the nearest emergency room.

## 2023-12-28 ENCOUNTER — APPOINTMENT (OUTPATIENT)
Dept: WOUND CARE | Facility: HOSPITAL | Age: 63
End: 2023-12-28
Attending: INTERNAL MEDICINE
Payer: COMMERCIAL

## 2023-12-28 PROCEDURE — 99183 HYPERBARIC OXYGEN THERAPY: CPT | Performed by: HOSPITALIST

## 2023-12-29 ENCOUNTER — HBO THERAPY VISIT (OUTPATIENT)
Dept: WOUND CARE | Facility: HOSPITAL | Age: 63
End: 2023-12-29
Attending: INTERNAL MEDICINE
Payer: COMMERCIAL

## 2023-12-29 VITALS
TEMPERATURE: 98 F | RESPIRATION RATE: 14 BRPM | HEART RATE: 92 BPM | DIASTOLIC BLOOD PRESSURE: 67 MMHG | SYSTOLIC BLOOD PRESSURE: 114 MMHG

## 2023-12-29 VITALS
HEART RATE: 81 BPM | SYSTOLIC BLOOD PRESSURE: 110 MMHG | TEMPERATURE: 98 F | RESPIRATION RATE: 10 BRPM | DIASTOLIC BLOOD PRESSURE: 77 MMHG

## 2023-12-29 NOTE — PROGRESS NOTES
Pre/Post Treatment Evaluation      Pre-Treatment Lung Evaluation: Clear to auscultation bilaterally    Left Ear Pre-Treatment Evaluation  Left Ear Clear: Yes  Left Ear Intact: Yes  Left Cerumen Removal: No  Pre Left teed stgstrstastdstest:st st1st grade    Right Ear Pre-Treatment Evaluation  Right Ear Clear: Yes  Right Ear Intact: Yes  Right Cerumen Removal: No  Pre Right teed stgstrstastdstest:st st1st grade      Post-Treatment Lung Evaluation: Clear to auscultation bilaterally    Left Ear Post-Treatment Evaluation  Left Ear Clear: Yes  Left Ear Intact: Yes  Left Cerumen Removal: No  Post Left teed stgstrstastdstest:st st1st grade    Right Ear Post-Treatment Evaluation  Right Ear Clear: Yes  Right Ear Intact: Yes  Right Cerumen Removal: No  Post Right teed stgstrstastdstest:st st1st grade      I supervised this Hyperbaric treatment and was immediately available throughout the course of the treatment. There is a trained emergency support team and ICU available at this facility to assist with complications.     Stefany Davison MD, 12/29/23, 11:21 AM

## 2024-01-02 ENCOUNTER — HBO THERAPY VISIT (OUTPATIENT)
Dept: WOUND CARE | Facility: HOSPITAL | Age: 64
End: 2024-01-02
Attending: INTERNAL MEDICINE
Payer: COMMERCIAL

## 2024-01-02 VITALS
SYSTOLIC BLOOD PRESSURE: 117 MMHG | HEART RATE: 84 BPM | RESPIRATION RATE: 10 BRPM | DIASTOLIC BLOOD PRESSURE: 79 MMHG | TEMPERATURE: 98 F

## 2024-01-02 PROCEDURE — 99183 HYPERBARIC OXYGEN THERAPY: CPT | Performed by: FAMILY MEDICINE

## 2024-01-02 NOTE — PROGRESS NOTES
HBO Treatment Details      Patient Name:    Sarahi Russell  MRN:  [id number]        YOB: 1960  Today's Date:  1/2/2024  Physician/Provider: Noe De La Torre MD  Facility: [facility]  Diagnosis: No diagnosis found.        Treatment Course Number: 12  Total Treatments Ordered:  20  Ordering Physician: Karley  Indication: Prep and preserve compromised skin graft  Risk Factors: Seizure;Barotrauma  HBO Treatment Start Date:   HBO Treatment Number:  12  Treatment Length:Other (Comment)  Treatment Depth: 2.0 JOHNNIE  HBO Treatment End Date:   HBO Discharge Outcome: Treatment Complete        HBO Treatment Details:  In-Patient Visit: no  Chamber Type:  Monoplace  Chamber #: 1  HBO Dive Log:    Treatment Protocol:  2.0 JOHNNIE without an air break      Treatment Details:  Pressurized Rate: 1.5 psi/min  Dive Rate Down:  1.5 psi/min  Dive Rate Up:  2.0 psi/min  Started Compression: 0903  Reached Compression: 0913  Patient on Air:    Patient Taken off Air:   Total Compression Time: 10 (Minutes)  Total Holding Time: 90 (Minutes)  Started Decompression: 1043  Reached Surface: 0913  Total Decompression Time: 7 (Minutes)  Total Airbreaks:   (Minutes)  Total Time of Treatment:   (Minutes)                         Vital Signs:  HBO Glucose:  - (Reference Range: 100-350 mg/dl)        Vitals:    01/02/24 0857 01/02/24 1056   BP: 131/85 117/79   Pulse: 106 84   Resp: 10 10   Temp: 97.6 °F (36.4 °C) 97.9 °F (36.6 °C)       Treatment Response:  Symptoms Noted During Treatment: None   Treatment Aborted:  No      Treatment Notes:  Pt. Treated in a hard sided chamber

## 2024-01-02 NOTE — PROGRESS NOTES
Pre/Post Treatment Evaluation      Pre-Treatment Lung Evaluation: Clear to auscultation bilaterally    Left Ear Pre-Treatment Evaluation  Left Ear Clear: Yes  Left Ear Intact: Yes  Left Cerumen Removal: No  Pre Left teed stgstrstastdstest:st st1st grade    Right Ear Pre-Treatment Evaluation  Right Ear Clear: Yes  Right Ear Intact: Yes  Right Cerumen Removal: No  Pre Right teed stgstrstastdstest:st st1st grade      Post-Treatment Lung Evaluation: Clear to auscultation bilaterally    Left Ear Post-Treatment Evaluation  Left Ear Clear: Yes  Left Ear Intact: Yes  Left Cerumen Removal: No  Post Left teed stgstrstastdstest:st st1st grade    Right Ear Post-Treatment Evaluation  Right Ear Clear: Yes  Right Ear Intact: Yes  Right Cerumen Removal: No  Post Right teed stgstrstastdstest:st st1st grade      I supervised this Hyperbaric treatment and was immediately available throughout the course of the treatment.  There is a trained emergency support team and ICU available at this facility to assist with complications.

## 2024-01-03 ENCOUNTER — HBO THERAPY VISIT (OUTPATIENT)
Dept: WOUND CARE | Facility: HOSPITAL | Age: 64
End: 2024-01-03
Attending: INTERNAL MEDICINE
Payer: COMMERCIAL

## 2024-01-03 VITALS
DIASTOLIC BLOOD PRESSURE: 81 MMHG | HEART RATE: 80 BPM | SYSTOLIC BLOOD PRESSURE: 111 MMHG | RESPIRATION RATE: 12 BRPM | TEMPERATURE: 98 F

## 2024-01-03 NOTE — PROGRESS NOTES
HBO Treatment Details      Patient Name:    Sarahi Russell  MRN:  [id number]        YOB: 1960  Today's Date:  1/3/2024  Physician/Provider: Juan C Crandall MD  Facility: [facility]  Diagnosis: No diagnosis found.        Treatment Course Number: 13  Total Treatments Ordered:  20  Ordering Physician: Karley  Indication: Prep and preserve compromised skin graft  Risk Factors: Seizure;Barotrauma  HBO Treatment Start Date:   HBO Treatment Number:  13  Treatment Length:Other (Comment)  Treatment Depth: 2.0 JOHNNIE  HBO Treatment End Date:   HBO Discharge Outcome: Treatment Complete        HBO Treatment Details:  In-Patient Visit: no  Chamber Type:  Monoplace  Chamber #: 1  HBO Dive Log:    Treatment Protocol:  2.5 JOHNNIE with 10 min air break      Treatment Details:  Pressurized Rate: 1.5 psi/min  Dive Rate Down:  1.5 psi/min  Dive Rate Up:  2.0 psi/min  Started Compression: 0913  Reached Compression: 0923  Patient on Air:    Patient Taken off Air:   Total Compression Time: 10 (Minutes)  Total Holding Time: 90 (Minutes)  Started Decompression: 1053  Reached Surface: 0923  Total Decompression Time: 7 (Minutes)  Total Airbreaks:   (Minutes)  Total Time of Treatment:   (Minutes)                         Vital Signs:  HBO Glucose:  - (Reference Range: 100-350 mg/dl)        Vitals:    01/03/24 0901 01/03/24 1109   BP: (!) 121/98 111/81   Pulse: 101 80   Resp: 10 12   Temp: 97.6 °F (36.4 °C) 97.8 °F (36.6 °C)       Treatment Response:  Symptoms Noted During Treatment: None   Treatment Aborted:  No      Treatment Notes:  Pt. Treated in a hard sided chamber

## 2024-01-04 ENCOUNTER — HBO THERAPY VISIT (OUTPATIENT)
Dept: WOUND CARE | Facility: HOSPITAL | Age: 64
End: 2024-01-04
Attending: INTERNAL MEDICINE
Payer: COMMERCIAL

## 2024-01-04 VITALS
RESPIRATION RATE: 10 BRPM | SYSTOLIC BLOOD PRESSURE: 117 MMHG | HEART RATE: 74 BPM | DIASTOLIC BLOOD PRESSURE: 81 MMHG | TEMPERATURE: 98 F

## 2024-01-04 PROCEDURE — 99183 HYPERBARIC OXYGEN THERAPY: CPT | Performed by: FAMILY MEDICINE

## 2024-01-04 NOTE — PROGRESS NOTES
HBO Treatment Details      Patient Name:    Sarahi Russell  MRN:  [id number]        YOB: 1960  Today's Date:  1/4/2024  Physician/Provider: Noe De La Torre MD  Facility: [facility]  Diagnosis: No diagnosis found.        Treatment Course Number: 14  Total Treatments Ordered:  20  Ordering Physician: Karley  Indication: Prep and preserve compromised skin graft  Risk Factors: Seizure;Barotrauma  HBO Treatment Start Date:   HBO Treatment Number:  14  Treatment Length:Other (Comment)  Treatment Depth: 2.0 JOHNNIE  HBO Treatment End Date:   HBO Discharge Outcome: Treatment Complete        HBO Treatment Details:  In-Patient Visit: no  Chamber Type:  Monoplace  Chamber #: 1  HBO Dive Log:    Treatment Protocol:  2.0 JOHNNIE without an air break      Treatment Details:  Pressurized Rate: 1.5 psi/min  Dive Rate Down:  1.5 psi/min  Dive Rate Up:  2.0 psi/min  Started Compression: 0902  Reached Compression: 0914  Patient on Air:    Patient Taken off Air:   Total Compression Time: 12 (Minutes)  Total Holding Time: 90 (Minutes)  Started Decompression: 1044  Reached Surface: 0914  Total Decompression Time: 7 (Minutes)  Total Airbreaks:   (Minutes)  Total Time of Treatment:   (Minutes)                         Vital Signs:  HBO Glucose:  - (Reference Range: 100-350 mg/dl)        Vitals:    01/04/24 0854 01/04/24 1110   BP: 93/67 117/81   Pulse: 75 74   Resp: (!) 8 10   Temp: 97.7 °F (36.5 °C) 97.7 °F (36.5 °C)       Treatment Response:  Symptoms Noted During Treatment: Other (Comment)   Treatment Aborted:  No      Treatment Notes:  Pt. Treated in hard sided chamber.  Pt. Felt dizzy while ascending.  Pt. Put on air mask and felt better

## 2024-01-05 ENCOUNTER — OFFICE VISIT (OUTPATIENT)
Dept: WOUND CARE | Facility: HOSPITAL | Age: 64
End: 2024-01-05
Attending: INTERNAL MEDICINE
Payer: COMMERCIAL

## 2024-01-05 DIAGNOSIS — S21.001D OPEN WOUND OF RIGHT BREAST, SUBSEQUENT ENCOUNTER: Primary | ICD-10-CM

## 2024-01-05 DIAGNOSIS — T86.828 NECROSIS OF FLAP (HCC): ICD-10-CM

## 2024-01-05 DIAGNOSIS — I96 NECROSIS OF FLAP (HCC): ICD-10-CM

## 2024-01-05 PROCEDURE — 99213 OFFICE O/P EST LOW 20 MIN: CPT

## 2024-01-05 NOTE — PROGRESS NOTES
[facility] PROGRESS   AND   HBO UTILIZATION REVIEW NOTE  DELORES MARTIN MD  1/5/2024    Chief Complaint:   Chief Complaint   Patient presents with    Wound Care     Patient is here for a wound care follow up. Her pain is currently 6/10.        HPI:   Subjective   Sarahi Russell is a 63 year old female coming in for a follow-up visit.    HPI    Wound partly improved. Some areas covered with mature epithelium - however - some areas covered with thick eschar - refer to picture below.   Attempted to lift eschar with forceps - there is nonviable tissue beneath   I discussed the case with patients surgeon Dr. Barclay - recommended surgical debridement to excise the eschar down to healthy bleeding tissue.   Also recommend start with honey gel after debridement     Review of Systems  Negative except HPI   Denies chest pain / SOB / palpitations  Denies fever.     Allergies  Allergies   Allergen Reactions    Levaquin [Levofloxacin] ITCHING and SWELLING    Shellfish-Derived Products ITCHING       Current Meds:  Current Outpatient Medications   Medication Sig Dispense Refill    HYDROcodone-acetaminophen 5-325 MG Oral Tab Take 1 tablet by mouth every 6 (six) hours as needed for Pain.      acidophilus-pectin Oral Cap Take 1 capsule by mouth daily.      diazePAM 5 MG Oral Tab Take 0.5 tablets (2.5 mg total) by mouth nightly as needed. (Patient not taking: Reported on 12/14/2023)      polyethylene glycol, PEG 3350-KCl-NaBcb-NaCl-NaSulf, (GAVILYTE-G) 236 g Oral Recon Soln  (Patient not taking: Reported on 12/14/2023)      cholecalciferol 125 MCG (5000 UT) Oral Tab Take 1 capsule by mouth daily. (Patient not taking: Reported on 12/14/2023)      Estrogens Conjugated (PREMARIN) 0.625 MG Oral Tab Take 1 tablet (0.625 mg total) by mouth daily.           EXAM:   Objective   Objective    Physical Exam    Vital Signs  There were no vitals filed for this visit.    Wound Assessment  Wound 12/12/23 Breast Left (Active)   Wound Image    12/17/23 0950       Wound 12/12/23 Breast Right (Active)       Wound 12/14/23 #1 Right breast Breast Right (Active)   Wound Image   01/05/24 0916   Drainage Amount Scant 01/05/24 0907   Drainage Description Serous;Yellow 01/05/24 0907   Wound Length (cm) 4.5 cm 01/05/24 0907   Wound Width (cm) 3.8 cm 01/05/24 0907   Wound Surface Area (cm^2) 17.1 cm^2 01/05/24 0907   Wound Depth (cm) 0.1 cm 01/05/24 0907   Wound Volume (cm^3) 1.71 cm^3 01/05/24 0907   Margins Well-defined edges 01/05/24 0907   Non-staged Wound Description Full thickness 01/05/24 0907   Andie-wound Assessment Edema 01/05/24 0907   Wound Bed Granulation (%) 5 % 12/14/23 0923   Wound Bed Epithelium (%) 40 % 01/05/24 0907   Wound Bed Slough (%) 10 % 01/05/24 0907   Wound Bed Eschar (%) 50 % 01/05/24 0907   Wound Odor None 01/05/24 0907   Shape Steristrips in place 12/14/23 0923   Tunneling? No 01/05/24 0907   Undermining? No 01/05/24 0907   Sinus Tracts? No 01/05/24 0907           ASSESSMENT AND PLAN:     Assessment   Assessment    Encounter Diagnosis  1. Open wound of right breast, subsequent encounter    2. Necrosis of flap (HCC)        Problem List  Patient Active Problem List   Diagnosis    Hepatitis C    LBBB (left bundle branch block)    Breast ptosis         MANAGEMENT    Recommend Surgical debridement on Monday with dr. Barclay.   Start medihoney on open area after debridement.   Continue HBO   Extend HBO to 20 treatments.     Patient Instructions     EXTEND HBO TO 20 TREATMENTS TOTAL  Call surgeon Dr. Barclay on Monday and schedule surgical debridement on Monday     Wound Cleaning and Dressings:    Wash your hands with soap and water. Always wear gloves while changing dressings. Donot touch wound / andie-wound skin with un-gloved hands. Remove old dressing, discard and place into trash.      DRESSINGS: xeroform / gauze - for now  Start honey gel after surgical debridement on Monday.   Change dressing daily.    Miscellaneous  Instructions:  Supplement with a daily multivitamin   Increase protein intake / consider protein supplements - see below    DIETARY MODIFICATIONS TO HELP WITH WOUND HEALING:    Protein: Meats, beans, eggs, milk and yogurt particularly Greek yogurt), tofu, soy nuts, soy protein products    Vitamin C: Citrus fruits and juices, strawberries, tomatoes, tomato juice, peppers, baked potatoes, spinach, broccoli, cauliflower, [place] sprouts, cabbage    Vitamin A: Dark green, leafy vegetables, orange or yellow vegetables, cantaloupe, fortified dairy products, liver, fortified cereals    Zinc: Fortified cereals, red meats, seafood    Consider Ran by [employer] (These are essential branch chain amino acids that help with tissue building and wound healing) and take 2 packets/day. you can order online at [employer] or [employer]    ADDITIONAL REMINDERS:    The treatment plan has been discussed at length with you and your provider. Follow all instructions carefully, it is very important. If you do not follow all instructions, you are at  risk of your wound not healing, infection, possible loss of limb and even end of life.  Please call the clinic during regular business hours ( 7:30 AM - 5:30 PM) if you notice increased bleeding, redness, warmth, pain or pus like drainage or start running a fever greater than 100.3.    For after hour emergencies, please call your primary physician or go to the nearest emergency room.      Patient/Caregiver Education: There are no barriers to learning. Medical education for above diagnosis given.   Answered all questions.    Outcome: Patient verbalizes understanding. Patient is notified to call with any questions, complications, allergies, or worsening or changing symptoms.  Patient is to call with any side effects or complications as a result of the treatments today.      DOCUMENTATION OF TIME SPENT: Code selection for this visit was based on time spent : 35 min on date of service in preparing to  see the patient, obtaining and/or reviewing separately obtained history, performing a medically appropriate examination, counseling and educating the patient/family/caregiver, ordering medications or testing, referring and communicating with other healthcare providers, documenting clinical information in the E HR, independently interpreting results and communicating results to the patient/family/caregiver and care coordination with the patient's other providers.    Followup: Return in about 1 week (around 1/12/2024).    Comment: Please note this report has been produced using speech recognition software and may contain errors related to that system including errors in grammar, punctuation, and spelling, as well as words and phrases that may be inappropriate. If there are any questions or concerns please feel free to contact the dictating provider for clarification.    Juan C Crandall MD  1/5/2024  5:03 PM

## 2024-01-05 NOTE — PROGRESS NOTES
Pre/Post Treatment Evaluation      Pre-Treatment Lung Evaluation: Clear to auscultation bilaterally    Left Ear Pre-Treatment Evaluation  Left Ear Clear: Yes  Left Ear Intact: Yes  Left Cerumen Removal: No  Pre Left teed stgstrstastdstest:st st1st grade    Right Ear Pre-Treatment Evaluation  Right Ear Clear: Yes  Right Ear Intact: Yes  Right Cerumen Removal: No  Pre Right teed stgstrstastdstest:st st1st grade      Post-Treatment Lung Evaluation: Clear to auscultation bilaterally    Left Ear Post-Treatment Evaluation  Left Ear Clear: Yes  Left Ear Intact: Yes  Left Cerumen Removal: No  Post Left teed stgstrstastdstest:st st1st grade    Right Ear Post-Treatment Evaluation  Right Ear Clear: Yes  Right Ear Intact: Yes  Right Cerumen Removal: No  Post Right teed stgstrstastdstest:st st1st grade      I supervised this Hyperbaric treatment and was immediately available throughout the course of the treatment.  There is a trained emergency support team and ICU available at this facility to assist with complications.   Statement Selected

## 2024-01-05 NOTE — PROGRESS NOTES
Weekly Wound Education Note    Teaching Provided To: Patient  Training Topics: Dressing;Discharge instructions;Cleasing and general instructions  Training Method: Demonstration;Explain/Verbal;Written  Training Response: Patient responds and understands        Notes: Cleanse right breast wound with saline or wound cleanser, apply xeroform gauze, cover with gauze and medipore tape. Change daily.

## 2024-01-05 NOTE — PATIENT INSTRUCTIONS
EXTEND HBO TO 20 TREATMENTS TOTAL  Call surgeon Dr. Barclay on Monday and schedule surgical debridement on Monday     Wound Cleaning and Dressings:    Wash your hands with soap and water. Always wear gloves while changing dressings. Donot touch wound / andie-wound skin with un-gloved hands. Remove old dressing, discard and place into trash.      DRESSINGS: xeroform / gauze - for now  Start honey gel after surgical debridement on Monday.   Change dressing daily.    Miscellaneous Instructions:  Supplement with a daily multivitamin   Increase protein intake / consider protein supplements - see below    DIETARY MODIFICATIONS TO HELP WITH WOUND HEALING:    Protein: Meats, beans, eggs, milk and yogurt particularly Greek yogurt), tofu, soy nuts, soy protein products    Vitamin C: Citrus fruits and juices, strawberries, tomatoes, tomato juice, peppers, baked potatoes, spinach, broccoli, cauliflower, [place] sprouts, cabbage    Vitamin A: Dark green, leafy vegetables, orange or yellow vegetables, cantaloupe, fortified dairy products, liver, fortified cereals    Zinc: Fortified cereals, red meats, seafood    Consider Ran by [employer] (These are essential branch chain amino acids that help with tissue building and wound healing) and take 2 packets/day. you can order online at [employer] or [employer]    ADDITIONAL REMINDERS:    The treatment plan has been discussed at length with you and your provider. Follow all instructions carefully, it is very important. If you do not follow all instructions, you are at  risk of your wound not healing, infection, possible loss of limb and even end of life.  Please call the clinic during regular business hours ( 7:30 AM - 5:30 PM) if you notice increased bleeding, redness, warmth, pain or pus like drainage or start running a fever greater than 100.3.    For after hour emergencies, please call your primary physician or go to the nearest emergency room.     Stable

## 2024-01-08 ENCOUNTER — APPOINTMENT (OUTPATIENT)
Dept: WOUND CARE | Facility: HOSPITAL | Age: 64
End: 2024-01-08
Attending: INTERNAL MEDICINE
Payer: COMMERCIAL

## 2024-01-08 VITALS
HEART RATE: 85 BPM | RESPIRATION RATE: 10 BRPM | TEMPERATURE: 98 F | DIASTOLIC BLOOD PRESSURE: 82 MMHG | SYSTOLIC BLOOD PRESSURE: 124 MMHG

## 2024-01-08 NOTE — PROGRESS NOTES
HBO Treatment Details      Patient Name:    Sarahi Russell  MRN:  [id number]        YOB: 1960  Today's Date:  1/8/2024  Physician/Provider: Juan C Crandall MD  Facility: [facility]  Diagnosis: No diagnosis found.        Treatment Course Number: 15  Total Treatments Ordered:  20  Ordering Physician:   Indication: Prep and preserve compromised skin graft  Risk Factors: Barotrauma;Seizure  HBO Treatment Start Date:   HBO Treatment Number:  15  Treatment Length:120 Minutes  Treatment Depth: 2.0 JOHNNIE  HBO Treatment End Date:   HBO Discharge Outcome: Treatment Complete        HBO Treatment Details:  In-Patient Visit: no  Chamber Type:  Monoplac  Chamber #: 1  HBO Dive Log:    Treatment Protocol:  2.0 JOHNNIE without an air break      Treatment Details:  Pressurized Rate: 1.5 psi/min  Dive Rate Down:  1.5 psi/min  Dive Rate Up:  2.0 psi/min  Started Compression: 0935  Reached Compression: 0945  Patient on Air:    Patient Taken off Air:   Total Compression Time: 10 (Minutes)  Total Holding Time: 90 (Minutes)  Started Decompression: 1115  Reached Surface: 0945  Total Decompression Time: 10 (Minutes)  Total Airbreaks:   (Minutes)  Total Time of Treatment:   (Minutes)                         Vital Signs:  HBO Glucose:  - (Reference Range: 100-350 mg/dl)        Vitals:    01/05/24 0913 01/05/24 1133   BP: 123/72 124/82   Pulse: 89 85   Resp: 10 10   Temp: 97.9 °F (36.6 °C) 97.7 °F (36.5 °C)       Treatment Response:  Symptoms Noted During Treatment: None   Treatment Aborted:  No      Treatment Notes:  Pt. Treated in a a hard sided chamber

## 2024-01-09 ENCOUNTER — HBO THERAPY VISIT (OUTPATIENT)
Dept: WOUND CARE | Facility: HOSPITAL | Age: 64
End: 2024-01-09
Attending: INTERNAL MEDICINE
Payer: COMMERCIAL

## 2024-01-09 VITALS
RESPIRATION RATE: 10 BRPM | HEART RATE: 80 BPM | SYSTOLIC BLOOD PRESSURE: 127 MMHG | TEMPERATURE: 98 F | DIASTOLIC BLOOD PRESSURE: 80 MMHG

## 2024-01-09 PROCEDURE — 99183 HYPERBARIC OXYGEN THERAPY: CPT | Performed by: FAMILY MEDICINE

## 2024-01-09 NOTE — PROGRESS NOTES
HBO Treatment Details      Patient Name:    Sarahi Russell  MRN:  [id number]        YOB: 1960  Today's Date:  1/9/2024  Physician/Provider: Noe De La Torre MD  Facility: [facility]  Diagnosis: No diagnosis found.        Treatment Course Number: 16  Total Treatments Ordered:  20  Ordering Physician: Karley  Indication: Prep and preserve compromised skin graft  Risk Factors: Seizure;Barotrauma  HBO Treatment Start Date:   HBO Treatment Number:  16  Treatment Length:Other (Comment)  Treatment Depth: 2.0 JOHNNIE  HBO Treatment End Date:   HBO Discharge Outcome: Treatment Complete        HBO Treatment Details:  In-Patient Visit: no  Chamber Type:  Monoplac  Chamber #: 1  HBO Dive Log:    Treatment Protocol:  2.0 JOHNNIE without an air break      Treatment Details:  Pressurized Rate: 1.5 psi/min  Dive Rate Down:  1.5 psi/min  Dive Rate Up:  2.0 psi/min  Started Compression: 0957  Reached Compression: 1006  Patient on Air:    Patient Taken off Air:   Total Compression Time: 9 (Minutes)  Total Holding Time: 90 (Minutes)  Started Decompression: 1136  Reached Surface: 1006  Total Decompression Time: 7 (Minutes)  Total Airbreaks:   (Minutes)  Total Time of Treatment:   (Minutes)                         Vital Signs:  HBO Glucose:  - (Reference Range: 100-350 mg/dl)        Vitals:    01/09/24 0945 01/09/24 1149   BP: 131/80 127/80   Pulse: 88 80   Resp: (!) 8 10   Temp: 97.8 °F (36.6 °C) 97.6 °F (36.4 °C)       Treatment Response:  Symptoms Noted During Treatment: Other (Comment) (Pt. felt dizzy, put herself on air)   Treatment Aborted:  No      Treatment Notes:  Pt. Treated in a hard sided chamber

## 2024-01-10 ENCOUNTER — OFFICE VISIT (OUTPATIENT)
Dept: WOUND CARE | Facility: HOSPITAL | Age: 64
End: 2024-01-10
Attending: INTERNAL MEDICINE
Payer: COMMERCIAL

## 2024-01-10 VITALS
SYSTOLIC BLOOD PRESSURE: 119 MMHG | HEART RATE: 75 BPM | DIASTOLIC BLOOD PRESSURE: 81 MMHG | TEMPERATURE: 98 F | RESPIRATION RATE: 10 BRPM

## 2024-01-10 VITALS
RESPIRATION RATE: 16 BRPM | SYSTOLIC BLOOD PRESSURE: 116 MMHG | TEMPERATURE: 98 F | HEART RATE: 90 BPM | DIASTOLIC BLOOD PRESSURE: 78 MMHG

## 2024-01-10 DIAGNOSIS — I96 NECROSIS OF FLAP (HCC): ICD-10-CM

## 2024-01-10 DIAGNOSIS — S21.001D OPEN WOUND OF RIGHT BREAST, SUBSEQUENT ENCOUNTER: Primary | ICD-10-CM

## 2024-01-10 DIAGNOSIS — T86.828 NECROSIS OF FLAP (HCC): ICD-10-CM

## 2024-01-10 PROCEDURE — 99213 OFFICE O/P EST LOW 20 MIN: CPT

## 2024-01-10 NOTE — PROGRESS NOTES
[facility] PROGRESS NOTE  And  HBO UTILIZATION REVIEW NOTE  DELORES MARTIN MD  1/10/2024    Chief Complaint:   Chief Complaint   Patient presents with    Wound Care     Follow-up for wound to right breast. Pain 6/10 and denies concerns at this time.        HPI:   Subjective   Sarahi Russell is a 63 year old female coming in for a follow-up visit.    HPI    Pt had excisional / surgical debridement of the necrotic area / eschar by surgeon Dr. Barclay - looks much better - has some pain - takes tylenol - does not want to take ibuprofen.   No s/o infection  Started honey gel yesterday.       Review of Systems  Negative except HPI   Denies chest pain / SOB / palpitations  Denies fever.     Allergies  Allergies   Allergen Reactions    Levaquin [Levofloxacin] ITCHING and SWELLING    Shellfish-Derived Products ITCHING       Current Meds:  Current Outpatient Medications   Medication Sig Dispense Refill    HYDROcodone-acetaminophen 5-325 MG Oral Tab Take 1 tablet by mouth every 6 (six) hours as needed for Pain.      acidophilus-pectin Oral Cap Take 1 capsule by mouth daily.      diazePAM 5 MG Oral Tab Take 0.5 tablets (2.5 mg total) by mouth nightly as needed. (Patient not taking: Reported on 12/14/2023)      polyethylene glycol, PEG 3350-KCl-NaBcb-NaCl-NaSulf, (GAVILYTE-G) 236 g Oral Recon Soln  (Patient not taking: Reported on 12/14/2023)      cholecalciferol 125 MCG (5000 UT) Oral Tab Take 1 capsule by mouth daily. (Patient not taking: Reported on 12/14/2023)      Estrogens Conjugated (PREMARIN) 0.625 MG Oral Tab Take 1 tablet (0.625 mg total) by mouth daily.           EXAM:   Objective   Objective    Physical Exam    Vital Signs  Vitals:    01/10/24 0916   BP: 116/78   Pulse: 90   Resp: 16   Temp: 97.6 °F (36.4 °C)       Wound Assessment  Wound 12/12/23 Breast Left (Active)   Wound Image   12/17/23 0950       Wound 12/12/23 Breast Right (Active)       Wound 12/14/23 #1 Right breast Breast Right (Active)   Wound  Image   01/10/24 0909   Drainage Amount Small 01/10/24 0920   Drainage Description Serous;Yellow 01/10/24 0920   Wound Length (cm) 3.5 cm 01/10/24 0920   Wound Width (cm) 3.2 cm 01/10/24 0920   Wound Surface Area (cm^2) 11.2 cm^2 01/10/24 0920   Wound Depth (cm) 0.1 cm 01/10/24 0920   Wound Volume (cm^3) 1.12 cm^3 01/10/24 0920   Margins Well-defined edges 01/10/24 0920   Non-staged Wound Description Full thickness 01/10/24 0920   Andie-wound Assessment Edema 01/10/24 0920   Wound Bed Granulation (%) 5 % 12/14/23 0923   Wound Bed Epithelium (%) 50 % 01/10/24 0920   Wound Bed Slough (%) 50 % 01/10/24 0920   Wound Bed Eschar (%) 50 % 01/05/24 0907   Wound Odor None 01/10/24 0920   Shape Steristrips in place 12/14/23 0923   Tunneling? No 01/10/24 0920   Undermining? No 01/10/24 0920   Sinus Tracts? No 01/10/24 0920           ASSESSMENT AND PLAN:     Assessment   Assessment    Encounter Diagnosis  1. Open wound of right breast, subsequent encounter    2. Necrosis of flap (HCC)        Problem List  Patient Active Problem List   Diagnosis    Hepatitis C    LBBB (left bundle branch block)    Breast ptosis         MANAGEMENT    Continue HBO treatments for now - extend 10 more for total 30 treatments     D/w surgeon.       PROCEDURES:    Honey gel to sloughy areas and xeroform over, covered with bordered gauze. Provided with Ran samples.       Patient Instructions     EXTEND HBO TO 30 TREATMENTS TOTAL    Wound Cleaning and Dressings:    Wash your hands with soap and water. Always wear gloves while changing dressings. Donot touch wound / andie-wound skin with un-gloved hands. Remove old dressing, discard and place into trash.      DRESSINGS: honey gel ONLY to sloughy areas/ xeroform / gauze - daily  Per  treatment ok to go in chamber    Miscellaneous Instructions:  Supplement with a daily multivitamin   Increase protein intake / consider protein supplements - see below    DIETARY MODIFICATIONS TO HELP WITH  WOUND HEALING:    Protein: Meats, beans, eggs, milk and yogurt particularly Greek yogurt), tofu, soy nuts, soy protein products    Vitamin C: Citrus fruits and juices, strawberries, tomatoes, tomato juice, peppers, baked potatoes, spinach, broccoli, cauliflower, [place] sprouts, cabbage    Vitamin A: Dark green, leafy vegetables, orange or yellow vegetables, cantaloupe, fortified dairy products, liver, fortified cereals    Zinc: Fortified cereals, red meats, seafood    Consider Ran by [employer] (These are essential branch chain amino acids that help with tissue building and wound healing) and take 2 packets/day. you can order online at [employer] or [employer]    ADDITIONAL REMINDERS:    The treatment plan has been discussed at length with you and your provider. Follow all instructions carefully, it is very important. If you do not follow all instructions, you are at  risk of your wound not healing, infection, possible loss of limb and even end of life.  Please call the clinic during regular business hours ( 7:30 AM - 5:30 PM) if you notice increased bleeding, redness, warmth, pain or pus like drainage or start running a fever greater than 100.3.    For after hour emergencies, please call your primary physician or go to the nearest emergency room.    Patient/Caregiver Education: There are no barriers to learning. Medical education for above diagnosis given.   Answered all questions.    Outcome: Patient verbalizes understanding. Patient is notified to call with any questions, complications, allergies, or worsening or changing symptoms.  Patient is to call with any side effects or complications as a result of the treatments today.      DOCUMENTATION OF TIME SPENT: Code selection for this visit was based on time spent : 35 min on date of service in preparing to see the patient, obtaining and/or reviewing separately obtained history, performing a medically appropriate examination, counseling and educating the  patient/family/caregiver, ordering medications or testing, referring and communicating with other healthcare providers, documenting clinical information in the E HR, independently interpreting results and communicating results to the patient/family/caregiver and care coordination with the patient's other providers.    Followup: Return in about 1 week (around 1/17/2024).      Note to Patient:  The 21st Century Cures Act makes medical notes like these available to patients in the interest of transparency. However, be advised this is a medical document and is intended as ssdw-lg-abrx communication; it is written in medical language and may appear blunt, direct, or contain abbreviations or verbiage that are unfamiliar. Medical documents are intended to carry relevant information, facts as evident, and the clinical opinion of the practitioner.    Also, please note that this report has been produced using speech recognition software and may contain errors related to that system including, but not limited to, errors in grammar, punctuation, and spelling, as well as words and phrases that possibly may have been recognized inappropriately.  If there are any questions or concerns, contact the dictating provider for clarification.      Juan C Crandall MD  1/10/2024  11:12 AM

## 2024-01-10 NOTE — PROGRESS NOTES
Pre/Post Treatment Evaluation      Pre-Treatment Lung Evaluation: Clear to auscultation bilaterally    Left Ear Pre-Treatment Evaluation  Left Ear Clear: Yes  Left Ear Intact: Yes  Left Cerumen Removal: No  Pre Left teed stgstrstastdstest:st st1st grade    Right Ear Pre-Treatment Evaluation  Right Ear Clear: Yes  Right Ear Intact: Yes  Right Cerumen Removal: No  Pre Right teed stgstrstastdstest:st st1st grade      Post-Treatment Lung Evaluation: Clear to auscultation bilaterally    Left Ear Post-Treatment Evaluation  Left Ear Clear: Yes  Left Ear Intact: Yes  Left Cerumen Removal: No  Post Left teed stgstrstastdstest:st st1st grade    Right Ear Post-Treatment Evaluation  Right Ear Clear: Yes  Right Ear Intact: Yes  Right Cerumen Removal: No  Post Right teed stgstrstastdstest:st st1st grade      I supervised this Hyperbaric treatment and was immediately available throughout the course of the treatment.  There is a trained emergency support team and ICU available at this facility to assist with complications.    Juan C Crandall MD, 01/10/24, 2:39 PM

## 2024-01-10 NOTE — PROGRESS NOTES
HBO Treatment Details      Patient Name:    Sarahi Russell  MRN:  [id number]        YOB: 1960  Today's Date:  1/10/2024  Physician/Provider: Juan C Crandall MD  Facility: [facility]  Diagnosis: No diagnosis found.        Treatment Course Number: 17  Total Treatments Ordered:  30  Ordering Physician:   Indication: Prep and preserve compromised skin graft  Risk Factors: Seizure;Barotrauma  HBO Treatment Start Date:   HBO Treatment Number:  17  Treatment Length:Other (Comment)  Treatment Depth: 2.0 JOHNNIE  HBO Treatment End Date:   HBO Discharge Outcome: Treatment Complete        HBO Treatment Details:  In-Patient Visit: no  Chamber Type:  Monoplace  Chamber #: 1  HBO Dive Log:    Treatment Protocol:  2.0 JOHNNIE without an air break    Treatment Details:  Pressurized Rate: 1.5 psi/min  Dive Rate Down:  1.5 psi/min  Dive Rate Up:  2.0 psi/min  Started Compression: 1027  Reached Compression: 1037  Patient on Air:    Patient Taken off Air:   Total Compression Time: 10 (Minutes)  Total Holding Time: 90 (Minutes)  Started Decompression: 1207  Reached Surface: 1037  Total Decompression Time: 7 (Minutes)  Total Airbreaks:   (Minutes)  Total Time of Treatment:   (Minutes)                         Vital Signs:  HBO Glucose:  - (Reference Range: 100-350 mg/dl)        Vitals:    01/10/24 1222   BP: 119/81   Pulse: 75   Resp: 10   Temp: 97.9 °F (36.6 °C)       Treatment Response:  Symptoms Noted During Treatment: None   Treatment Aborted:  No      Treatment Notes:  Pt. Treated in a hard sided chamber

## 2024-01-10 NOTE — PATIENT INSTRUCTIONS
EXTEND HBO TO 30 TREATMENTS TOTAL    Wound Cleaning and Dressings:    Wash your hands with soap and water. Always wear gloves while changing dressings. Donot touch wound / andie-wound skin with un-gloved hands. Remove old dressing, discard and place into trash.      DRESSINGS: honey gel ONLY to sloughy areas/ xeroform / gauze - daily  Per  treatment ok to go in chamber    Miscellaneous Instructions:  Supplement with a daily multivitamin   Increase protein intake / consider protein supplements - see below    DIETARY MODIFICATIONS TO HELP WITH WOUND HEALING:    Protein: Meats, beans, eggs, milk and yogurt particularly Greek yogurt), tofu, soy nuts, soy protein products    Vitamin C: Citrus fruits and juices, strawberries, tomatoes, tomato juice, peppers, baked potatoes, spinach, broccoli, cauliflower, [place] sprouts, cabbage    Vitamin A: Dark green, leafy vegetables, orange or yellow vegetables, cantaloupe, fortified dairy products, liver, fortified cereals    Zinc: Fortified cereals, red meats, seafood    Consider Ran by [employer] (These are essential branch chain amino acids that help with tissue building and wound healing) and take 2 packets/day. you can order online at [employer] or [employer]    ADDITIONAL REMINDERS:    The treatment plan has been discussed at length with you and your provider. Follow all instructions carefully, it is very important. If you do not follow all instructions, you are at  risk of your wound not healing, infection, possible loss of limb and even end of life.  Please call the clinic during regular business hours ( 7:30 AM - 5:30 PM) if you notice increased bleeding, redness, warmth, pain or pus like drainage or start running a fever greater than 100.3.    For after hour emergencies, please call your primary physician or go to the nearest emergency room.

## 2024-01-10 NOTE — PROGRESS NOTES
Weekly Wound Education Note    Teaching Provided To: Patient  Training Topics: Cleasing and general instructions;Dressing;Discharge instructions  Training Method: Explain/Verbal  Training Response: Patient responds and understands;Reinforcement needed        Notes: Stable. Honey gel to sloughy areas and xeroform over, covered with bordered gauze. Provided with Ran samples. Pt scheduled for HBOT after.    *Wound picture taken in  HBOT encounter.

## 2024-01-11 ENCOUNTER — APPOINTMENT (OUTPATIENT)
Dept: WOUND CARE | Facility: HOSPITAL | Age: 64
End: 2024-01-11
Attending: INTERNAL MEDICINE
Payer: COMMERCIAL

## 2024-01-12 ENCOUNTER — APPOINTMENT (OUTPATIENT)
Dept: WOUND CARE | Facility: HOSPITAL | Age: 64
End: 2024-01-12
Attending: INTERNAL MEDICINE
Payer: COMMERCIAL

## 2024-01-12 VITALS
HEART RATE: 80 BPM | TEMPERATURE: 98 F | RESPIRATION RATE: 14 BRPM | SYSTOLIC BLOOD PRESSURE: 116 MMHG | DIASTOLIC BLOOD PRESSURE: 82 MMHG

## 2024-01-12 NOTE — PROGRESS NOTES
Pre/Post Treatment Evaluation      Pre-Treatment Lung Evaluation: Clear to auscultation bilaterally    Left Ear Pre-Treatment Evaluation  Left Ear Clear: Yes  Left Ear Intact: Yes  Left Cerumen Removal: No  Pre Left teed stgstrstastdstest:st st1st grade    Right Ear Pre-Treatment Evaluation  Right Ear Clear: Yes  Right Ear Intact: Yes  Right Cerumen Removal: No         Post-Treatment Lung Evaluation: Clear to auscultation bilaterally    Left Ear Post-Treatment Evaluation  Left Ear Clear: Yes  Left Ear Intact: Yes  Left Cerumen Removal: No  Post Left teed stgstrstastdstest:st st1st grade    Right Ear Post-Treatment Evaluation  Right Ear Clear: Yes  Right Ear Intact: Yes  Right Cerumen Removal: No  Post Right teed stgstrstastdstest:st st1st grade      I supervised this Hyperbaric treatment and was immediately available throughout the course of the treatment.  There is a trained emergency support team and ICU available at this facility to assist with complications.    Juan C Crandall MD, 01/12/24, 2:33 PM

## 2024-01-12 NOTE — PROGRESS NOTES
HBO Treatment Details      Patient Name:    Sarahi Russell  MRN:  [id number]        YOB: 1960  Today's Date:  2024  Physician/Provider: Juan C Crandall MD  Facility: [facility]  Diagnosis: No diagnosis found.        Treatment Course Number: 18  Total Treatments Ordered:  30  Ordering Physician: Karley  Indication:  Compromised Graft  Risk Factors:  barotrauma, seizure  HBO Treatment Start Date:   HBO Treatment Number:  18  Treatment Length:   Treatment Depth:    HBO Treatment End Date:   HBO Discharge Outcome: No Change        HBO Treatment Details:  In-Patient Visit: no  Chamber Type:  hard sided monoplace  Chamber #: 1  HBO Dive Lo  Treatment Protocol:  2.0 JOHNNIE 90 minutes without air break      Treatment Details:  Pressurized Rate:    Dive Rate Down:  1.5  Dive Rate Up:  2.0  Started Compression:  0908  Reached Compression:  0920  Patient on Air:    Patient Taken off Air:   Total Compression Time:   (Minutes)  Total Holding Time:   (Minutes)  Started Decompression:  1050  Reached Surface:  1100  Total Decompression Time:   (Minutes)  Total Airbreaks:   (Minutes)  Total Time of Treatment:   100 (Minutes)                         Vital Signs:  HBO Glucose:  - (Reference Range: 100-350 mg/dl)        Vitals:    24 0936 24 1208   BP: 128/87 116/82   Pulse: 98 80   Resp: 14 14   Temp: 98.9 °F (37.2 °C) 97.8 °F (36.6 °C)       Treatment Response:  Symptoms Noted During Treatment:     Treatment Aborted:  no      Treatment Notes:

## 2024-01-15 ENCOUNTER — HBO THERAPY VISIT (OUTPATIENT)
Dept: WOUND CARE | Facility: HOSPITAL | Age: 64
End: 2024-01-15
Attending: INTERNAL MEDICINE
Payer: COMMERCIAL

## 2024-01-15 VITALS
HEART RATE: 85 BPM | RESPIRATION RATE: 10 BRPM | DIASTOLIC BLOOD PRESSURE: 78 MMHG | TEMPERATURE: 98 F | SYSTOLIC BLOOD PRESSURE: 128 MMHG

## 2024-01-15 NOTE — PROGRESS NOTES
Pre/Post Treatment Evaluation      Pre-Treatment Lung Evaluation: Clear to auscultation bilaterally    Left Ear Pre-Treatment Evaluation  Left Ear Clear: Yes  Left Ear Intact: Yes  Left Cerumen Removal: No  Pre Left teed stgstrstastdstest:st st1st grade    Right Ear Pre-Treatment Evaluation  Right Ear Clear: Yes  Right Ear Intact: Yes  Right Cerumen Removal: No  Pre Right teed stgstrstastdstest:st st1st grade      Post-Treatment Lung Evaluation: Clear to auscultation bilaterally    Left Ear Post-Treatment Evaluation  Left Ear Clear: Yes  Left Ear Intact: Yes  Left Cerumen Removal: No  Post Left teed stgstrstastdstest:st st1st grade    Right Ear Post-Treatment Evaluation  Right Ear Clear: Yes  Right Ear Intact: Yes  Right Cerumen Removal: No  Post Right teed stgstrstastdstest:st st1st grade      I supervised this Hyperbaric treatment and was immediately available throughout the course of the treatment.  There is a trained emergency support team and ICU available at this facility to assist with complications.    Juan C Crandall MD, 01/15/24, 1:10 PM

## 2024-01-15 NOTE — PROGRESS NOTES
HBO Treatment Details      Patient Name:    Sarahi Russell  MRN:  [id number]        YOB: 1960  Today's Date:  1/15/2024  Physician/Provider: Juan C Crandall MD  Facility: [facility]  Diagnosis: No diagnosis found.        Treatment Course Number: 19  Total Treatments Ordered:  30  Ordering Physician: Karley  Indication: Prep and preserve compromised skin graft  Risk Factors: Barotrauma;Seizure  HBO Treatment Start Date:   HBO Treatment Number:  19  Treatment Length:Other (Comment)  Treatment Depth: 2.0 JOHNNIE  HBO Treatment End Date:   HBO Discharge Outcome: Treatment Complete        HBO Treatment Details:  In-Patient Visit: no  Chamber Type:  Monoplace  Chamber #: 1  HBO Dive Log:    Treatment Protocol:  2.0 JOHNNIE without an air break      Treatment Details:  Pressurized Rate: 1.5 psi/min  Dive Rate Down:  1.5 psi/min  Dive Rate Up:  2.0 psi/min  Started Compression: 1035  Reached Compression: 1045  Patient on Air:    Patient Taken off Air:   Total Compression Time: 10 (Minutes)  Total Holding Time: 90 (Minutes)  Started Decompression: 1215  Reached Surface: 1045  Total Decompression Time: 7 (Minutes)  Total Airbreaks:   (Minutes)  Total Time of Treatment:   (Minutes)                         Vital Signs:  HBO Glucose:  - (Reference Range: 100-350 mg/dl)        Vitals:    01/15/24 1222 01/15/24 1232   BP: 123/82 128/78   Pulse: 87 85   Resp: 10 10   Temp: 97.5 °F (36.4 °C) 97.6 °F (36.4 °C)       Treatment Response:  Symptoms Noted During Treatment: None   Treatment Aborted:  No      Treatment Notes:  Pt. Treated in a hard sided chamber

## 2024-01-16 ENCOUNTER — APPOINTMENT (OUTPATIENT)
Dept: WOUND CARE | Facility: HOSPITAL | Age: 64
End: 2024-01-16
Attending: INTERNAL MEDICINE
Payer: COMMERCIAL

## 2024-01-16 VITALS
RESPIRATION RATE: 10 BRPM | SYSTOLIC BLOOD PRESSURE: 128 MMHG | DIASTOLIC BLOOD PRESSURE: 83 MMHG | TEMPERATURE: 98 F | HEART RATE: 97 BPM

## 2024-01-16 PROCEDURE — 99183 HYPERBARIC OXYGEN THERAPY: CPT | Performed by: FAMILY MEDICINE

## 2024-01-17 ENCOUNTER — HBO THERAPY VISIT (OUTPATIENT)
Dept: WOUND CARE | Facility: HOSPITAL | Age: 64
End: 2024-01-17
Attending: INTERNAL MEDICINE
Payer: COMMERCIAL

## 2024-01-17 VITALS
TEMPERATURE: 99 F | DIASTOLIC BLOOD PRESSURE: 82 MMHG | RESPIRATION RATE: 18 BRPM | HEART RATE: 88 BPM | SYSTOLIC BLOOD PRESSURE: 127 MMHG

## 2024-01-17 VITALS
HEART RATE: 87 BPM | SYSTOLIC BLOOD PRESSURE: 125 MMHG | TEMPERATURE: 98 F | RESPIRATION RATE: 10 BRPM | DIASTOLIC BLOOD PRESSURE: 83 MMHG

## 2024-01-17 DIAGNOSIS — T86.828 NECROSIS OF FLAP (HCC): ICD-10-CM

## 2024-01-17 DIAGNOSIS — S21.001D OPEN WOUND OF RIGHT BREAST, SUBSEQUENT ENCOUNTER: Primary | ICD-10-CM

## 2024-01-17 DIAGNOSIS — I96 NECROSIS OF FLAP (HCC): ICD-10-CM

## 2024-01-17 DIAGNOSIS — R23.8 SLOUGHING OF WOUND: ICD-10-CM

## 2024-01-17 PROCEDURE — 11042 DBRDMT SUBQ TIS 1ST 20SQCM/<: CPT | Performed by: INTERNAL MEDICINE

## 2024-01-17 RX ORDER — COLLAGENASE SANTYL 250 [ARB'U]/G
OINTMENT TOPICAL
Qty: 90 G | Refills: 0 | Status: SHIPPED | OUTPATIENT
Start: 2024-01-17

## 2024-01-17 NOTE — PROGRESS NOTES
Patient ID: Sarahi Russell is a 63 year old female.    Debridement Old surgical Right Breast   Wound 12/14/23 #1 Right breast Breast Right    Performed by: Juan C Walden MD  Authorized by: Juan C Walden MD      Consent   Consent obtained? verbal  Consent given by: patient  Risks discussed? procedural risks discussed    Debridement Details  Performed by: physician  Debridement type: surgical  Level of debridement: subcutaneous tissue  Pain control: lidocaine 4%  Pain control administration type: topical    Pre-debridement measurements  Length (cm): 4.2  Width (cm): 3.8  Depth (cm): 0.1  Surface Area (cm^2): 15.96    Post-debridement measurements  Length (cm): 4.2  Width (cm): 3.9  Depth (cm): 0.2  Percent debrided: 50%  Surface Area (cm^2): 16.38  Area Debrided (cm^2): 8.19  Volume (cm^3): 3.28    Tissue and other material debrided: subcutaneous tissue  Devitalized tissue debrided: biofilm, necrotic debris and slough  Instrument(s) utilized: curette, blade and forceps  Bleeding: medium  Hemostasis obtained with: pressure  Procedural pain (0-10): 2  Post-procedural pain: 0   Response to treatment: procedure was tolerated well

## 2024-01-17 NOTE — PROGRESS NOTES
Weekly Wound Education Note    Teaching Provided To: Patient  Training Topics: Cleasing and general instructions;Discharge instructions;Dressing  Training Method: Explain/Verbal  Training Response: Patient responds and understands;Reinforcement needed        Notes: Stable. honey gel, adaptic, bordered gauze. Seeing surgeon tomorrow. Pt requested notes be sent to PCP - faxed to [phone].  Nickie ordered today.

## 2024-01-17 NOTE — PROGRESS NOTES
Pre/Post Treatment Evaluation      Pre-Treatment Lung Evaluation: Clear to auscultation bilaterally    Left Ear Pre-Treatment Evaluation  Left Ear Clear: Yes  Left Ear Intact: Yes  Left Cerumen Removal: No  Pre Left teed stgstrstastdstest:st st1st grade    Right Ear Pre-Treatment Evaluation  Right Ear Clear: Yes  Right Ear Intact: Yes  Right Cerumen Removal: No  Pre Right teed stgstrstastdstest:st st1st grade      Post-Treatment Lung Evaluation: Clear to auscultation bilaterally    Left Ear Post-Treatment Evaluation  Left Ear Clear: Yes  Left Ear Intact: Yes  Left Cerumen Removal: No  Post Left teed stgstrstastdstest:st st1st grade    Right Ear Post-Treatment Evaluation  Right Ear Clear: Yes  Right Ear Intact: Yes  Right Cerumen Removal: No  Post Right teed stgstrstastdstest:st st1st grade      I supervised this Hyperbaric treatment and was immediately available throughout the course of the treatment.  There is a trained emergency support team and ICU available at this facility to assist with complications.    Juan C Crandall MD, 01/17/24, 1:50 PM

## 2024-01-17 NOTE — PROGRESS NOTES
[facility] PROGRESS NOTE  DELORES MARTIN MD  1/17/2024    Chief Complaint:   Chief Complaint   Patient presents with    Wound Care     Patient is here for follow up and has no new concerns. She arrives with Xeroform, bordered gauze and tape.        HPI:   Subjective   Sarahi Russell is a 63 year old female coming in for a follow-up visit.    HPI    Wound improving overall - slightly more granulation - continues to have slough and nonviable tissue on wound bed. Wound base debrided today down to healthy bleeding tissue.   No andie-wound redness / swelling/ fever. No s/o infection.     Pain is tolerable  - taking only tylenol PRN.     Pt is frustrated with the slow healing process. She is anxious and slightly depressed and tearful during the exam.   She is going through marital separation and moving to a new home this weekend and this adds to her stress.     Pt requested opinion reg: use of medical marijuana for relaxation / anxiety     She has appt with PCP Dr. Nettles on Friday - I called his office and left message requesting callback.     Contacted Surgeon Dr. Barclay and discussed care in detail.   She will remove sutures tomorrow.       Review of Systems  Negative except HPI   Denies chest pain / SOB / palpitations  Denies fever.     Allergies  Allergies   Allergen Reactions    Levaquin [Levofloxacin] ITCHING and SWELLING    Shellfish-Derived Products ITCHING       Current Meds:  Current Outpatient Medications   Medication Sig Dispense Refill    collagenase (SANTYL) 250 UNIT/GM External Ointment Apply topical daily. 90 g 0    HYDROcodone-acetaminophen 5-325 MG Oral Tab Take 1 tablet by mouth every 6 (six) hours as needed for Pain.      acidophilus-pectin Oral Cap Take 1 capsule by mouth daily.      diazePAM 5 MG Oral Tab Take 0.5 tablets (2.5 mg total) by mouth nightly as needed. (Patient not taking: Reported on 12/14/2023)      polyethylene glycol, PEG 3350-KCl-NaBcb-NaCl-NaSulf, (GAVILYTE-G) 236 g Oral  Recon Soln  (Patient not taking: Reported on 12/14/2023)      cholecalciferol 125 MCG (5000 UT) Oral Tab Take 1 capsule by mouth daily. (Patient not taking: Reported on 12/14/2023)      Estrogens Conjugated (PREMARIN) 0.625 MG Oral Tab Take 1 tablet (0.625 mg total) by mouth daily.           EXAM:   Objective   Objective    Physical Exam    Vital Signs  Vitals:    01/17/24 0929   BP: 127/82   Pulse: 88   Resp: 18   Temp: 98.8 °F (37.1 °C)       Wound Assessment  Wound 12/12/23 Breast Left (Active)   Wound Image   12/17/23 0950       Wound 12/12/23 Breast Right (Active)       Wound 12/14/23 #1 Right breast Breast Right (Active)   Wound Image   01/17/24 0950   Drainage Amount Small 01/17/24 0930   Drainage Description Serous;Yellow 01/17/24 0930   Wound Length (cm) 4.2 cm 01/17/24 0930   Wound Width (cm) 3.8 cm 01/17/24 0930   Wound Surface Area (cm^2) 15.96 cm^2 01/17/24 0930   Wound Depth (cm) 0.1 cm 01/17/24 0930   Wound Volume (cm^3) 1.596 cm^3 01/17/24 0930   Margins Well-defined edges 01/17/24 0930   Non-staged Wound Description Full thickness 01/17/24 0930   Kira-wound Assessment Edema 01/17/24 0930   Wound Granulation Tissue Pink;Spongy 01/17/24 0930   Wound Bed Granulation (%) 10 % 01/17/24 0930   Wound Bed Epithelium (%) 50 % 01/17/24 0930   Wound Bed Slough (%) 40 % 01/17/24 0930   Wound Bed Eschar (%) 50 % 01/05/24 0907   Wound Odor None 01/17/24 0930   Shape Steristrips in place 12/14/23 0923   Tunneling? No 01/10/24 0920   Undermining? No 01/10/24 0920   Sinus Tracts? No 01/10/24 0920           ASSESSMENT AND PLAN:     Assessment   Assessment    Encounter Diagnosis  No diagnosis found.    Problem List  Patient Active Problem List   Diagnosis    Hepatitis C    LBBB (left bundle branch block)    Breast ptosis         MANAGEMENT    Serial surgical debridements as needed.   Removed one suture today  Recommend remove all sutures tomorrow - during consult with surgeon. Surgeon agreeable.     honey gel,  adaptic, bordered gauze. Applied today - script sent for santyl     Pt requested notes be sent to PCP - faxed to [phone].  Offered emotional support  Stress reduction methods discussed.    Start CBT / psychotherapy soon.   See PCP on Monday.     Time spent trying to reach PCP and surgeon  and discussion with them and also counseling the pt - this is in addition to regular time to see and examine patient and perform surgical debridement.       PROCEDURES:    Debridement Old surgical Right Breast   Wound 12/14/23 #1 Right breast Breast Right     Performed by: Juan C Walden MD  Authorized by: Juan C Walden MD       Consent   Consent obtained? verbal  Consent given by: patient  Risks discussed? procedural risks discussed     Debridement Details  Performed by: physician  Debridement type: surgical  Level of debridement: subcutaneous tissue  Pain control: lidocaine 4%  Pain control administration type: topical     Pre-debridement measurements  Length (cm): 4.2  Width (cm): 3.8  Depth (cm): 0.1  Surface Area (cm^2): 15.96     Post-debridement measurements  Length (cm): 4.2  Width (cm): 3.9  Depth (cm): 0.2  Percent debrided: 50%  Surface Area (cm^2): 16.38  Area Debrided (cm^2): 8.19  Volume (cm^3): 3.28     Tissue and other material debrided: subcutaneous tissue  Devitalized tissue debrided: biofilm, necrotic debris and slough  Instrument(s) utilized: curette, blade and forceps  Bleeding: medium  Hemostasis obtained with: pressure  Procedural pain (0-10): 2  Post-procedural pain: 0   Response to treatment: procedure was tolerated well          MEDICAL NECESSSITY FOR SURGICAL DEBRIDEMENT:    Surgical debridement is necessary in this patient to stimulate and hasten the rate of wound healing by  removing biofilm and devitalized tissue, down to subcutaneous level.   Research has consistently proven that traditional bedside sharp debridement provides excellent results in reducing the square surface area of  wounds. The use of surgical sharp debridement can effectively remove devitalized tissue, hence aids in achieving good wound healing and advancing the rate of wound closure,  and is a proven significant component to advancing wound closure.    Patient Instructions     PLAN HBO  30 TREATMENTS TOTAL- WILL EXTEND FURTHER IF NEEDED   Dc honey gel - switch to santyl.   Serial debridements as needed.   Remove sutures tomorrow - followup with surgeon tomorrow.   Stress reduction methods      Wound Cleaning and Dressings:    Wash your hands with soap and water. Always wear gloves while changing dressings. Donot touch wound / andie-wound skin with un-gloved hands. Remove old dressing, discard and place into trash.      DRESSINGS: SANTYL / GAUZE    Miscellaneous Instructions:  Supplement with a daily multivitamin   Increase protein intake / consider protein supplements - see below    DIETARY MODIFICATIONS TO HELP WITH WOUND HEALING:    Protein: Meats, beans, eggs, milk and yogurt particularly Greek yogurt), tofu, soy nuts, soy protein products    Vitamin C: Citrus fruits and juices, strawberries, tomatoes, tomato juice, peppers, baked potatoes, spinach, broccoli, cauliflower, [place] sprouts, cabbage    Vitamin A: Dark green, leafy vegetables, orange or yellow vegetables, cantaloupe, fortified dairy products, liver, fortified cereals    Zinc: Fortified cereals, red meats, seafood    Consider Ran by [employer] (These are essential branch chain amino acids that help with tissue building and wound healing) and take 2 packets/day. you can order online at [employer] or [employer]    ADDITIONAL REMINDERS:    The treatment plan has been discussed at length with you and your provider. Follow all instructions carefully, it is very important. If you do not follow all instructions, you are at  risk of your wound not healing, infection, possible loss of limb and even end of life.  Please call the clinic during regular business hours ( 7:30 AM  - 5:30 PM) if you notice increased bleeding, redness, warmth, pain or pus like drainage or start running a fever greater than 100.3.    For after hour emergencies, please call your primary physician or go to the nearest emergency room.    Orders  Orders Placed This Encounter   Procedures    Debridement Old surgical Right Breast       Meds & Refills for this Visit:  Requested Prescriptions     Signed Prescriptions Disp Refills    collagenase (SANTYL) 250 UNIT/GM External Ointment 90 g 0     Sig: Apply topical daily.         Patient/Caregiver Education: There are no barriers to learning. Medical education for above diagnosis given.   Answered all questions.    Outcome: Patient verbalizes understanding. Patient is notified to call with any questions, complications, allergies, or worsening or changing symptoms.  Patient is to call with any side effects or complications as a result of the treatments today.      DOCUMENTATION OF TIME SPENT: Code selection for this visit was based on time spent : 70 min on date of service in preparing to see the patient, obtaining and/or reviewing separately obtained history, performing a medically appropriate examination, counseling and educating the patient/family/caregiver, ordering medications or testing, referring and communicating with other healthcare providers, documenting clinical information in the E HR, independently interpreting results and communicating results to the patient/family/caregiver and care coordination with the patient's other providers.    Followup: Return in about 1 week (around 1/24/2024) for Wound followup.      Note to Patient:  The 21st Century Cures Act makes medical notes like these available to patients in the interest of transparency. However, be advised this is a medical document and is intended as [id number] communication; it is written in medical language and may appear blunt, direct, or contain abbreviations or verbiage that are unfamiliar. Medical  documents are intended to carry relevant information, facts as evident, and the clinical opinion of the practitioner.    Also, please note that this report has been produced using speech recognition software and may contain errors related to that system including, but not limited to, errors in grammar, punctuation, and spelling, as well as words and phrases that possibly may have been recognized inappropriately.  If there are any questions or concerns, contact the dictating provider for clarification.      Juan C Crandall MD  1/17/2024  11:38 AM

## 2024-01-17 NOTE — PATIENT INSTRUCTIONS
PLAN HBO  30 TREATMENTS TOTAL- WILL EXTEND FURTHER IF NEEDED   Dc honey gel - switch to santyl.   Serial debridements as needed.   Remove sutures tomorrow - followup with surgeon tomorrow.   Stress reduction methods    Wound Cleaning and Dressings:    Wash your hands with soap and water. Always wear gloves while changing dressings. Donot touch wound / andie-wound skin with un-gloved hands. Remove old dressing, discard and place into trash.    DRESSINGS: SANTYL / GAUZE    Miscellaneous Instructions:  Supplement with a daily multivitamin   Increase protein intake / consider protein supplements - see below    DIETARY MODIFICATIONS TO HELP WITH WOUND HEALING:    Protein: Meats, beans, eggs, milk and yogurt particularly Greek yogurt), tofu, soy nuts, soy protein products    Vitamin C: Citrus fruits and juices, strawberries, tomatoes, tomato juice, peppers, baked potatoes, spinach, broccoli, cauliflower, [place] sprouts, cabbage    Vitamin A: Dark green, leafy vegetables, orange or yellow vegetables, cantaloupe, fortified dairy products, liver, fortified cereals    Zinc: Fortified cereals, red meats, seafood    Consider Ran by [employer] (These are essential branch chain amino acids that help with tissue building and wound healing) and take 2 packets/day. you can order online at [employer] or [employer]    ADDITIONAL REMINDERS:    The treatment plan has been discussed at length with you and your provider. Follow all instructions carefully, it is very important. If you do not follow all instructions, you are at  risk of your wound not healing, infection, possible loss of limb and even end of life.  Please call the clinic during regular business hours ( 7:30 AM - 5:30 PM) if you notice increased bleeding, redness, warmth, pain or pus like drainage or start running a fever greater than 100.3.    For after hour emergencies, please call your primary physician or go to the nearest emergency room.

## 2024-01-17 NOTE — PROGRESS NOTES
HBO Treatment Details      Patient Name:    Sarahi Russell  MRN:  [id number]        YOB: 1960  Today's Date:  1/17/2024  Physician/Provider: Juan C Crandall MD  Facility: [facility]  Diagnosis: No diagnosis found.        Treatment Course Number: 21  Total Treatments Ordered:  30  Ordering Physician: Karley  Indication: Prep and preserve compromised skin graft  Risk Factors: Barotrauma;Seizure  HBO Treatment Start Date:   HBO Treatment Number:  21  Treatment Length:Other (Comment)  Treatment Depth: 2.0 JOHNNIE  HBO Treatment End Date:   HBO Discharge Outcome: Treatment Complete        HBO Treatment Details:  In-Patient Visit: no  Chamber Type:  Monoplace  Chamber #: 1  HBO Dive Log:    Treatment Protocol:  2.0 JOHNNIE without an air break      Treatment Details:  Pressurized Rate: 1.5 psi/min  Dive Rate Down:  1.5 psi/min  Dive Rate Up:  2.0 psi/min  Started Compression: 1033  Reached Compression: 1042  Patient on Air:    Patient Taken off Air:   Total Compression Time: 9 (Minutes)  Total Holding Time: 90 (Minutes)  Started Decompression: 1212  Reached Surface: 1042  Total Decompression Time: 7 (Minutes)  Total Airbreaks:   (Minutes)  Total Time of Treatment:   (Minutes)                         Vital Signs:  HBO Glucose:  - (Reference Range: 100-350 mg/dl)        Vitals:    01/17/24 1223   BP: 125/83   Pulse: 87   Resp: 10   Temp: 97.7 °F (36.5 °C)       Treatment Response:  Symptoms Noted During Treatment: Vertigo   Treatment Aborted:  No      Treatment Notes:  Pt. Treated in a hard sided chamber

## 2024-01-18 ENCOUNTER — APPOINTMENT (OUTPATIENT)
Dept: WOUND CARE | Facility: HOSPITAL | Age: 64
End: 2024-01-18
Attending: INTERNAL MEDICINE
Payer: COMMERCIAL

## 2024-01-19 ENCOUNTER — APPOINTMENT (OUTPATIENT)
Dept: WOUND CARE | Facility: HOSPITAL | Age: 64
End: 2024-01-19
Attending: INTERNAL MEDICINE
Payer: COMMERCIAL

## 2024-01-19 VITALS
TEMPERATURE: 97 F | RESPIRATION RATE: 16 BRPM | HEART RATE: 77 BPM | DIASTOLIC BLOOD PRESSURE: 80 MMHG | SYSTOLIC BLOOD PRESSURE: 120 MMHG

## 2024-01-19 NOTE — PROGRESS NOTES
Pre/Post Treatment Evaluation      Pre-Treatment Lung Evaluation: Clear to auscultation bilaterally    Left Ear Pre-Treatment Evaluation  Left Ear Clear: Yes  Left Ear Intact: Yes  Left Cerumen Removal: No  Pre Left teed stgstrstastdstest:st st1st grade    Right Ear Pre-Treatment Evaluation  Right Ear Clear: Yes  Right Ear Intact: Yes  Right Cerumen Removal: No  Pre Right teed stgstrstastdstest:st st1st grade      Post-Treatment Lung Evaluation: Clear to auscultation bilaterally    Left Ear Post-Treatment Evaluation  Left Ear Clear: Yes  Left Ear Intact: Yes  Left Cerumen Removal: No  Post Left teed stgstrstastdstest:st st1st grade    Right Ear Post-Treatment Evaluation  Right Ear Clear: Yes  Right Ear Intact: Yes  Right Cerumen Removal: No  Post Right teed stgstrstastdstest:st st1st grade      I supervised this Hyperbaric treatment and was immediately available throughout the course of the treatment.  There is a trained emergency support team and ICU available at this facility to assist with complications.  Juan C Crandall MD, 01/19/24, 12:09 PM

## 2024-01-19 NOTE — PROGRESS NOTES
HBO Treatment Details      Patient Name:    Sarahi Russell  MRN:  [id number]        YOB: 1960  Today's Date:  2024  Physician/Provider: Juan C Crandall MD  Facility: [facility]  Diagnosis: No diagnosis found.        Treatment Course Number: 22  Total Treatments Ordered:  30  Ordering Physician:   Indication: Prep and preserve compromised skin graft  Risk Factors: Barotrauma;Seizure  HBO Treatment Start Date:   HBO Treatment Number:  22  Treatment Length:Other (Comment)  Treatment Depth: 2.0 JOHNNIE  HBO Treatment End Date:   HBO Discharge Outcome: Treatment Complete        HBO Treatment Details:  In-Patient Visit: no  Chamber Type:  Monoplace  Chamber #: 2  HBO Dive Lo  Treatment Protocol:  2.0 JOHNNIE with no air break      Treatment Details:  Pressurized Rate: 1.5 psi/min  Dive Rate Down:  1.5 psi/min  Dive Rate Up:  2.0 psi/min  Started Compression: 1012  Reached Compression: 1023  Patient on Air:    Patient Taken off Air:   Total Compression Time: 11 (Minutes)  Total Holding Time: 87 (Minutes)  Started Decompression: 1150  Reached Surface: 1023  Total Decompression Time: 10 (Minutes)  Total Airbreaks:   (Minutes)  Total Time of Treatment:   (Minutes)                         Vital Signs:  HBO Glucose: n/a - (Reference Range: 100-350 mg/dl)        Vitals:    24 0945 24 1205   BP: 127/82 120/80   Pulse: 92 77   Resp: 14 16   Temp: 97.6 °F (36.4 °C) 97.3 °F (36.3 °C)       Treatment Response:  Symptoms Noted During Treatment: None   Treatment Aborted:  no      Treatment Notes:  Patient treated in hard sided chamber.

## 2024-01-22 ENCOUNTER — HBO THERAPY VISIT (OUTPATIENT)
Dept: WOUND CARE | Facility: HOSPITAL | Age: 64
End: 2024-01-22
Attending: INTERNAL MEDICINE
Payer: COMMERCIAL

## 2024-01-22 VITALS
SYSTOLIC BLOOD PRESSURE: 113 MMHG | TEMPERATURE: 98 F | DIASTOLIC BLOOD PRESSURE: 74 MMHG | HEART RATE: 72 BPM | RESPIRATION RATE: 10 BRPM

## 2024-01-22 NOTE — PROGRESS NOTES
HBO Treatment Details      Patient Name:    Sarahi Russell  MRN:  [id number]        YOB: 1960  Today's Date:  1/22/2024  Physician/Provider: Juan C Crandall MD  Facility: [facility]  Diagnosis: No diagnosis found.        Treatment Course Number: 23  Total Treatments Ordered:  30  Ordering Physician: Karley  Indication: Prep and preserve compromised skin graft  Risk Factors: Seizure;Barotrauma  HBO Treatment Start Date:   HBO Treatment Number:  23  Treatment Length:Other (Comment)  Treatment Depth: 2.0 JOHNNIE  HBO Treatment End Date:   HBO Discharge Outcome: Treatment Complete        HBO Treatment Details:  In-Patient Visit: no  Chamber Type:  Monoplace  Chamber #: 1  HBO Dive Log:    Treatment Protocol:  2.5 JOHNNIE with 10 min air break      Treatment Details:  Pressurized Rate: 1.5 psi/min  Dive Rate Down:  1.5 psi/min  Dive Rate Up:  2.0 psi/min  Started Compression: 1022  Reached Compression: 1033  Patient on Air:    Patient Taken off Air:   Total Compression Time: 11 (Minutes)  Total Holding Time: 90 (Minutes)  Started Decompression: 1203  Reached Surface: 1033  Total Decompression Time: 7 (Minutes)  Total Airbreaks:   (Minutes)  Total Time of Treatment:   (Minutes)                         Vital Signs:  HBO Glucose:  - (Reference Range: 100-350 mg/dl)        Vitals:    01/22/24 1015 01/22/24 1218   BP: 122/80 113/74   Pulse: 97 72   Resp: 10 10   Temp: 98.4 °F (36.9 °C) 97.9 °F (36.6 °C)       Treatment Response:  Symptoms Noted During Treatment: None   Treatment Aborted:  No      Treatment Notes:  Pt. Treated in a hard sided chamber

## 2024-01-23 ENCOUNTER — APPOINTMENT (OUTPATIENT)
Dept: WOUND CARE | Facility: HOSPITAL | Age: 64
End: 2024-01-23
Attending: INTERNAL MEDICINE
Payer: COMMERCIAL

## 2024-01-24 ENCOUNTER — APPOINTMENT (OUTPATIENT)
Dept: WOUND CARE | Facility: HOSPITAL | Age: 64
End: 2024-01-24
Attending: INTERNAL MEDICINE
Payer: COMMERCIAL

## 2024-01-24 VITALS
SYSTOLIC BLOOD PRESSURE: 124 MMHG | TEMPERATURE: 98 F | RESPIRATION RATE: 18 BRPM | HEART RATE: 78 BPM | DIASTOLIC BLOOD PRESSURE: 66 MMHG

## 2024-01-24 DIAGNOSIS — I96 NECROSIS OF FLAP (HCC): ICD-10-CM

## 2024-01-24 DIAGNOSIS — R23.8 SLOUGHING OF WOUND: ICD-10-CM

## 2024-01-24 DIAGNOSIS — S21.001D OPEN WOUND OF RIGHT BREAST, SUBSEQUENT ENCOUNTER: Primary | ICD-10-CM

## 2024-01-24 DIAGNOSIS — T86.828 NECROSIS OF FLAP (HCC): ICD-10-CM

## 2024-01-24 PROCEDURE — 11042 DBRDMT SUBQ TIS 1ST 20SQCM/<: CPT | Performed by: INTERNAL MEDICINE

## 2024-01-24 NOTE — PROGRESS NOTES
Patient ID: Sarahi Russell is a 63 year old female.    Debridement Old surgical Right Breast   Wound 12/14/23 #1 Right breast Breast Right    Performed by: Juan C Walden MD  Authorized by: Juan C Walden MD      Consent   Consent obtained? verbal  Consent given by: patient  Risks discussed? procedural risks discussed    Debridement Details  Performed by: physician  Debridement type: surgical  Level of debridement: subcutaneous tissue  Pain control: lidocaine 4%    Pre-debridement measurements  Length (cm): 3.4  Width (cm): 3.5  Depth (cm): 0.1  Surface Area (cm^2): 11.9    Post-debridement measurements  Length (cm): 3.4  Width (cm): 3.7  Depth (cm): 0.4  Percent debrided: 50%  Surface Area (cm^2): 12.58  Area Debrided (cm^2): 6.29  Volume (cm^3): 5.03    Tissue and other material debrided: subcutaneous tissue  Devitalized tissue debrided: biofilm, necrotic debris and slough  Instrument(s) utilized: curette  Bleeding: medium  Hemostasis obtained with: pressure  Procedural pain (0-10): 3  Post-procedural pain: 0   Response to treatment: procedure was tolerated well

## 2024-01-24 NOTE — PATIENT INSTRUCTIONS
Start santyl for enzymatic debridement.   Serial debridements as needed.   followup with surgeon tomorrow.   Stress reduction methods    Wound Cleaning and Dressings:    Wash your hands with soap and water. Always wear gloves while changing dressings. Donot touch wound / andie-wound skin with un-gloved hands. Remove old dressing, discard and place into trash.    DRESSINGS: SANTYL / moist GAUZE    Miscellaneous Instructions:  Supplement with a daily multivitamin   Increase protein intake / consider protein supplements - see below    DIETARY MODIFICATIONS TO HELP WITH WOUND HEALING:    Protein: Meats, beans, eggs, milk and yogurt particularly Greek yogurt), tofu, soy nuts, soy protein products    Vitamin C: Citrus fruits and juices, strawberries, tomatoes, tomato juice, peppers, baked potatoes, spinach, broccoli, cauliflower, [place] sprouts, cabbage    Vitamin A: Dark green, leafy vegetables, orange or yellow vegetables, cantaloupe, fortified dairy products, liver, fortified cereals    Zinc: Fortified cereals, red meats, seafood    Consider Ran by [employer] (These are essential branch chain amino acids that help with tissue building and wound healing) and take 2 packets/day. you can order online at [employer] or [employer]    ADDITIONAL REMINDERS:    The treatment plan has been discussed at length with you and your provider. Follow all instructions carefully, it is very important. If you do not follow all instructions, you are at  risk of your wound not healing, infection, possible loss of limb and even end of life.  Please call the clinic during regular business hours ( 7:30 AM - 5:30 PM) if you notice increased bleeding, redness, warmth, pain or pus like drainage or start running a fever greater than 100.3.    For after hour emergencies, please call your primary physician or go to the nearest emergency room.

## 2024-01-24 NOTE — PROGRESS NOTES
[facility] PROGRESS NOTE  DELORES MARTIN MD  1/24/2024    Chief Complaint:   Chief Complaint   Patient presents with    Wound Care     Patients is here for a follow up. Patients stated she is having pain 3/10 and She got a santyl.       HPI:   Subjective   Sarahi Russell is a 63 year old female coming in for a follow-up visit.    HPI    Wound improving overall - lot of epithelium has moved in.     There continues to be significant necrotic tissue in the 10-12' o clock segment and small amount around 4 ' o clock.     Insurance did not pay for santyl - however She paid cash for it.   She will be starting it today.     Seeing surgeon tomorrow.     Discontinued HBO at #  23    Review of Systems  Negative except HPI   Denies chest pain / SOB / palpitations  Denies fever.     Allergies  Allergies   Allergen Reactions    Levaquin [Levofloxacin] ITCHING and SWELLING    Shellfish-Derived Products ITCHING       Current Meds:  Current Outpatient Medications   Medication Sig Dispense Refill    collagenase (SANTYL) 250 UNIT/GM External Ointment Apply topical daily. 90 g 0    HYDROcodone-acetaminophen 5-325 MG Oral Tab Take 1 tablet by mouth every 6 (six) hours as needed for Pain.      acidophilus-pectin Oral Cap Take 1 capsule by mouth daily.      diazePAM 5 MG Oral Tab Take 0.5 tablets (2.5 mg total) by mouth nightly as needed. (Patient not taking: Reported on 12/14/2023)      polyethylene glycol, PEG 3350-KCl-NaBcb-NaCl-NaSulf, (GAVILYTE-G) 236 g Oral Recon Soln  (Patient not taking: Reported on 12/14/2023)      cholecalciferol 125 MCG (5000 UT) Oral Tab Take 1 capsule by mouth daily. (Patient not taking: Reported on 12/14/2023)      Estrogens Conjugated (PREMARIN) 0.625 MG Oral Tab Take 1 tablet (0.625 mg total) by mouth daily.           EXAM:   Objective   Objective    Physical Exam    Vital Signs  Vitals:    01/24/24 0924   BP: 124/66   Pulse: 78   Resp: 18   Temp: 97.6 °F (36.4 °C)       Wound Assessment  Wound  12/12/23 Breast Left (Active)   Wound Image   12/17/23 0950       Wound 12/12/23 Breast Right (Active)       Wound 12/14/23 #1 Right breast Breast Right (Active)   Wound Image    01/24/24 0920   Drainage Amount Small 01/24/24 0920   Drainage Description Serosanguineous 01/24/24 0920   Wound Length (cm) 3.4 cm 01/24/24 0920   Wound Width (cm) 3.5 cm 01/24/24 0920   Wound Surface Area (cm^2) 11.9 cm^2 01/24/24 0920   Wound Depth (cm) 0.1 cm 01/24/24 0920   Wound Volume (cm^3) 1.19 cm^3 01/24/24 0920   Margins Well-defined edges 01/24/24 0920   Non-staged Wound Description Full thickness 01/24/24 0920   Kira-wound Assessment Edema 01/24/24 0920   Wound Granulation Tissue Pink;Spongy 01/17/24 0930   Wound Bed Granulation (%) 10 % 01/17/24 0930   Wound Bed Epithelium (%) 40 % 01/24/24 0920   Wound Bed Slough (%) 30 % 01/24/24 0920   Wound Bed Eschar (%) 30 % 01/24/24 0920   Wound Odor None 01/24/24 0920   Shape Steristrips in place 12/14/23 0923   Tunneling? No 01/10/24 0920   Undermining? No 01/10/24 0920   Sinus Tracts? No 01/10/24 0920           ASSESSMENT AND PLAN:     Assessment   Assessment    Encounter Diagnosis  1. Open wound of right breast, subsequent encounter    2. Necrosis of flap (HCC)    3. Sloughing of wound        Problem List  Patient Active Problem List   Diagnosis    Hepatitis C    LBBB (left bundle branch block)    Breast ptosis         MANAGEMENT    Santyl for enzymatic debridement  Serial surgical debridement  F/up surgeon as recommended  Support bra.   High protein diet.   F/up one week.     PROCEDURES:    Debridement Old surgical Right Breast   Wound 12/14/23 #1 Right breast Breast Right     Performed by: Juan C Walden MD  Authorized by: Juan C Walden MD       Consent   Consent obtained? verbal  Consent given by: patient  Risks discussed? procedural risks discussed     Debridement Details  Performed by: physician  Debridement type: surgical  Level of debridement: subcutaneous  tissue  Pain control: lidocaine 4%     Pre-debridement measurements  Length (cm): 3.4  Width (cm): 3.5  Depth (cm): 0.1  Surface Area (cm^2): 11.9     Post-debridement measurements  Length (cm): 3.4  Width (cm): 3.7  Depth (cm): 0.4  Percent debrided: 50%  Surface Area (cm^2): 12.58  Area Debrided (cm^2): 6.29  Volume (cm^3): 5.03     Tissue and other material debrided: subcutaneous tissue  Devitalized tissue debrided: biofilm, necrotic debris and slough  Instrument(s) utilized: curette  Bleeding: medium  Hemostasis obtained with: pressure  Procedural pain (0-10): 3  Post-procedural pain: 0   Response to treatment: procedure was tolerated well         MEDICAL NECESSSITY FOR SURGICAL DEBRIDEMENT:    Surgical debridement is necessary in this patient to stimulate and hasten the rate of wound healing by  removing biofilm and devitalized tissue, down to subcutaneous level.   Research has consistently proven that traditional bedside sharp debridement provides excellent results in reducing the square surface area of wounds. The use of surgical sharp debridement can effectively remove devitalized tissue, hence aids in achieving good wound healing and advancing the rate of wound closure,  and is a proven significant component to advancing wound closure.  Patient Instructions     Start santyl for enzymatic debridement.   Serial debridements as needed.   followup with surgeon tomorrow.   Stress reduction methods    Wound Cleaning and Dressings:    Wash your hands with soap and water. Always wear gloves while changing dressings. Donot touch wound / andie-wound skin with un-gloved hands. Remove old dressing, discard and place into trash.    DRESSINGS: SANTYL / moist GAUZE    Miscellaneous Instructions:  Supplement with a daily multivitamin   Increase protein intake / consider protein supplements - see below    DIETARY MODIFICATIONS TO HELP WITH WOUND HEALING:    Protein: Meats, beans, eggs, milk and yogurt particularly Greek  yogurt), tofu, soy nuts, soy protein products    Vitamin C: Citrus fruits and juices, strawberries, tomatoes, tomato juice, peppers, baked potatoes, spinach, broccoli, cauliflower, [place] sprouts, cabbage    Vitamin A: Dark green, leafy vegetables, orange or yellow vegetables, cantaloupe, fortified dairy products, liver, fortified cereals    Zinc: Fortified cereals, red meats, seafood    Consider Ran by [employer] (These are essential branch chain amino acids that help with tissue building and wound healing) and take 2 packets/day. you can order online at [employer] or [employer]    ADDITIONAL REMINDERS:    The treatment plan has been discussed at length with you and your provider. Follow all instructions carefully, it is very important. If you do not follow all instructions, you are at  risk of your wound not healing, infection, possible loss of limb and even end of life.  Please call the clinic during regular business hours ( 7:30 AM - 5:30 PM) if you notice increased bleeding, redness, warmth, pain or pus like drainage or start running a fever greater than 100.3.    For after hour emergencies, please call your primary physician or go to the nearest emergency room.    Orders  Orders Placed This Encounter   Procedures    Debridement Old surgical Right Breast         Patient/Caregiver Education: There are no barriers to learning. Medical education for above diagnosis given.   Answered all questions.    Outcome: Patient verbalizes understanding. Patient is notified to call with any questions, complications, allergies, or worsening or changing symptoms.  Patient is to call with any side effects or complications as a result of the treatments today.      DOCUMENTATION OF TIME SPENT: Code selection for this visit was based on time spent : 45 min on date of service in preparing to see the patient, obtaining and/or reviewing separately obtained history, performing a medically appropriate examination, counseling and educating  the patient/family/caregiver, ordering medications or testing, referring and communicating with other healthcare providers, documenting clinical information in the E HR, independently interpreting results and communicating results to the patient/family/caregiver and care coordination with the patient's other providers.    Followup: Return in about 1 week (around 1/31/2024).      Note to Patient:  The 21st Century Cures Act makes medical notes like these available to patients in the interest of transparency. However, be advised this is a medical document and is intended as [id number] communication; it is written in medical language and may appear blunt, direct, or contain abbreviations or verbiage that are unfamiliar. Medical documents are intended to carry relevant information, facts as evident, and the clinical opinion of the practitioner.    Also, please note that this report has been produced using speech recognition software and may contain errors related to that system including, but not limited to, errors in grammar, punctuation, and spelling, as well as words and phrases that possibly may have been recognized inappropriately.  If there are any questions or concerns, contact the dictating provider for clarification.      Juan C Crandall MD  1/24/2024  12:29 PM

## 2024-01-25 ENCOUNTER — APPOINTMENT (OUTPATIENT)
Dept: WOUND CARE | Facility: HOSPITAL | Age: 64
End: 2024-01-25
Attending: INTERNAL MEDICINE
Payer: COMMERCIAL

## 2024-01-26 ENCOUNTER — APPOINTMENT (OUTPATIENT)
Dept: WOUND CARE | Facility: HOSPITAL | Age: 64
End: 2024-01-26
Attending: INTERNAL MEDICINE
Payer: COMMERCIAL

## 2024-01-26 NOTE — PROGRESS NOTES
Weekly Wound Education Note       Training Topics: Cleasing and general instructions;Discharge instructions;Dressing  Training Method: Explain/Verbal  Training Response: Patient responds and understands;Reinforcement needed        Notes: Stable. Start santyl, xeroform, bordered gauze. Handed patient a mirror so she was able to see the wound bit better while we did the dressings.

## 2024-01-29 ENCOUNTER — APPOINTMENT (OUTPATIENT)
Dept: WOUND CARE | Facility: HOSPITAL | Age: 64
End: 2024-01-29
Attending: INTERNAL MEDICINE
Payer: COMMERCIAL

## 2024-01-30 ENCOUNTER — APPOINTMENT (OUTPATIENT)
Dept: WOUND CARE | Facility: HOSPITAL | Age: 64
End: 2024-01-30
Attending: INTERNAL MEDICINE
Payer: COMMERCIAL

## 2024-01-31 ENCOUNTER — APPOINTMENT (OUTPATIENT)
Dept: WOUND CARE | Facility: HOSPITAL | Age: 64
End: 2024-01-31
Attending: INTERNAL MEDICINE
Payer: COMMERCIAL

## 2024-01-31 VITALS
TEMPERATURE: 98 F | SYSTOLIC BLOOD PRESSURE: 123 MMHG | HEART RATE: 77 BPM | RESPIRATION RATE: 16 BRPM | DIASTOLIC BLOOD PRESSURE: 77 MMHG

## 2024-01-31 DIAGNOSIS — T86.828 NECROSIS OF FLAP (HCC): ICD-10-CM

## 2024-01-31 DIAGNOSIS — I96 NECROSIS OF FLAP (HCC): ICD-10-CM

## 2024-01-31 DIAGNOSIS — S21.001D OPEN WOUND OF RIGHT BREAST, SUBSEQUENT ENCOUNTER: Primary | ICD-10-CM

## 2024-01-31 DIAGNOSIS — R23.8 SLOUGHING OF WOUND: ICD-10-CM

## 2024-01-31 PROCEDURE — 11042 DBRDMT SUBQ TIS 1ST 20SQCM/<: CPT | Performed by: INTERNAL MEDICINE

## 2024-01-31 RX ORDER — BUPROPION HYDROCHLORIDE 75 MG/1
75 TABLET ORAL 2 TIMES DAILY
COMMUNITY

## 2024-01-31 RX ORDER — ESCITALOPRAM OXALATE 10 MG/1
10 TABLET ORAL DAILY
COMMUNITY

## 2024-01-31 NOTE — PROGRESS NOTES
Weekly Wound Education Note    Teaching Provided To: Patient  Training Topics: Cleasing and general instructions;Discharge instructions;Dressing  Training Method: Explain/Verbal  Training Response: Patient responds and understands;Reinforcement needed        Notes: Stable. Continue santyl, xeroform, and bordered gauze

## 2024-01-31 NOTE — PROGRESS NOTES
[facility] PROGRESS NOTE  DELORES MARTIN MD  1/31/2024    Chief Complaint:   Chief Complaint   Patient presents with    Wound Care     Follow-up for wound to right breast. Denies pain or concerns this time. States surgeon started her on oral about 4 days ago.        HPI:   Subjective   Sarahi Russell is a 63 year old female coming in for a follow-up visit.    HPI        Review of Systems  Negative except HPI   Denies chest pain / SOB / palpitations  Denies fever.     Allergies  Allergies   Allergen Reactions    Levaquin [Levofloxacin] ITCHING and SWELLING    Shellfish-Derived Products ITCHING       Current Meds:  Current Outpatient Medications   Medication Sig Dispense Refill    escitalopram 10 MG Oral Tab Take 1 tablet (10 mg total) by mouth daily.      buPROPion 75 MG Oral Tab Take 1 tablet (75 mg total) by mouth 2 (two) times daily.      diazePAM 5 MG Oral Tab Take 0.5 tablets (2.5 mg total) by mouth nightly as needed.      collagenase (SANTYL) 250 UNIT/GM External Ointment Apply topical daily. 90 g 0    HYDROcodone-acetaminophen 5-325 MG Oral Tab Take 1 tablet by mouth every 6 (six) hours as needed for Pain.      acidophilus-pectin Oral Cap Take 1 capsule by mouth daily.      polyethylene glycol, PEG 3350-KCl-NaBcb-NaCl-NaSulf, (GAVILYTE-G) 236 g Oral Recon Soln  (Patient not taking: Reported on 12/14/2023)      cholecalciferol 125 MCG (5000 UT) Oral Tab Take 1 capsule by mouth daily. (Patient not taking: Reported on 12/14/2023)      Estrogens Conjugated (PREMARIN) 0.625 MG Oral Tab Take 1 tablet (0.625 mg total) by mouth daily.           EXAM:   Objective   Objective    Physical Exam    Vital Signs  Vitals:    01/31/24 0914   BP: 123/77   Pulse: 77   Resp: 16   Temp: 97.6 °F (36.4 °C)       Wound Assessment  Wound 12/12/23 Breast Left (Active)   Wound Image   12/17/23 0950       Wound 12/12/23 Breast Right (Active)       Wound 12/14/23 #1 Right breast Breast Right (Active)   Wound Image   01/31/24  0941   Drainage Amount Small 01/31/24 0917   Drainage Description Yellow;Serous 01/31/24 0917   Wound Length (cm) 3.6 cm 01/31/24 0917   Wound Width (cm) 3.3 cm 01/31/24 0917   Wound Surface Area (cm^2) 11.88 cm^2 01/31/24 0917   Wound Depth (cm) 0.4 cm 01/31/24 0917   Wound Volume (cm^3) 4.752 cm^3 01/31/24 0917   Margins Well-defined edges 01/31/24 0917   Non-staged Wound Description Full thickness 01/31/24 0917   Kira-wound Assessment Edema 01/31/24 0917   Wound Granulation Tissue Pink;Spongy 01/17/24 0930   Wound Bed Granulation (%) 10 % 01/17/24 0930   Wound Bed Epithelium (%) 50 % 01/31/24 0917   Wound Bed Slough (%) 50 % 01/31/24 0917   Wound Bed Eschar (%) 30 % 01/24/24 0920   Wound Odor None 01/31/24 0917   Shape Steristrips in place 12/14/23 0923   Tunneling? No 01/31/24 0917   Undermining? No 01/31/24 0917   Sinus Tracts? No 01/31/24 0917           ASSESSMENT AND PLAN:     Assessment   Assessment    Encounter Diagnosis  1. Open wound of right breast, subsequent encounter    2. Necrosis of flap (HCC)    3. Sloughing of wound        Problem List  Patient Active Problem List   Diagnosis    Hepatitis C    LBBB (left bundle branch block)    Breast ptosis         MANAGEMENT    Continue santyl for enzymatic debridement.   D/w surgeon Dr. Barclay in detail.   Will consider switching to wet-to-dry   Monitor wound closely.     PROCEDURES:    Debridement Old surgical Right Breast   Wound 12/14/23 #1 Right breast Breast Right     Performed by: Juan C Walden MD  Authorized by: Juan C Walden MD       Consent   Consent obtained? verbal  Consent given by: patient  Risks discussed? procedural risks discussed     Debridement Details  Performed by: physician  Debridement type: surgical  Level of debridement: subcutaneous tissue  Pain control: lidocaine 4%     Pre-debridement measurements  Length (cm): 3.6  Width (cm): 3.3  Depth (cm): 0.4  Surface Area (cm^2): 11.88     Post-debridement measurements  Length  (cm): 3.6  Width (cm): 3.3  Depth (cm): 1  Percent debrided: 60%  Surface Area (cm^2): 11.88  Area Debrided (cm^2): 7.13  Volume (cm^3): 11.88     Tissue and other material debrided: adipose and subcutaneous tissue  Devitalized tissue debrided: necrotic debris and slough  Instrument(s) utilized: blade, curette and forceps  Bleeding: medium  Hemostasis obtained with: pressure and silver nitrate  Procedural pain (0-10): 2  Post-procedural pain: 0   Response to treatment: procedure was tolerated well         Patient Instructions     Continue  santyl for enzymatic debridement.   Serial debridements as needed.   Followup with surgeon as recommended  Stress reduction methods    Wound Cleaning and Dressings:    Wash your hands with soap and water. Always wear gloves while changing dressings. Donot touch wound / andie-wound skin with un-gloved hands. Remove old dressing, discard and place into trash.    DRESSINGS: SANTYL / moist GAUZE    Miscellaneous Instructions:  Supplement with a daily multivitamin   Increase protein intake / consider protein supplements - see below    DIETARY MODIFICATIONS TO HELP WITH WOUND HEALING:    Protein: Meats, beans, eggs, milk and yogurt particularly Greek yogurt), tofu, soy nuts, soy protein products    Vitamin C: Citrus fruits and juices, strawberries, tomatoes, tomato juice, peppers, baked potatoes, spinach, broccoli, cauliflower, [place] sprouts, cabbage    Vitamin A: Dark green, leafy vegetables, orange or yellow vegetables, cantaloupe, fortified dairy products, liver, fortified cereals    Zinc: Fortified cereals, red meats, seafood    Consider Ran by [employer] (These are essential branch chain amino acids that help with tissue building and wound healing) and take 2 packets/day. you can order online at [employer] or [employer]    ADDITIONAL REMINDERS:    The treatment plan has been discussed at length with you and your provider. Follow all instructions carefully, it is very important. If you  do not follow all instructions, you are at  risk of your wound not healing, infection, possible loss of limb and even end of life.  Please call the clinic during regular business hours ( 7:30 AM - 5:30 PM) if you notice increased bleeding, redness, warmth, pain or pus like drainage or start running a fever greater than 100.3.    For after hour emergencies, please call your primary physician or go to the nearest emergency room.    Orders  Orders Placed This Encounter   Procedures    Debridement Old surgical Right Breast     Patient/Caregiver Education: There are no barriers to learning. Medical education for above diagnosis given.   Answered all questions.    Outcome: Patient verbalizes understanding. Patient is notified to call with any questions, complications, allergies, or worsening or changing symptoms.  Patient is to call with any side effects or complications as a result of the treatments today.      DOCUMENTATION OF TIME SPENT: Code selection for this visit was based on time spent : 45 min on date of service in preparing to see the patient, obtaining and/or reviewing separately obtained history, performing a medically appropriate examination, counseling and educating the patient/family/caregiver, ordering medications or testing, referring and communicating with other healthcare providers, documenting clinical information in the E HR, independently interpreting results and communicating results to the patient/family/caregiver and care coordination with the patient's other providers.    Followup: Return in about 1 week (around 2/7/2024) for Wound followup.      Note to Patient:  The 21st Century Cures Act makes medical notes like these available to patients in the interest of transparency. However, be advised this is a medical document and is intended as [id number] communication; it is written in medical language and may appear blunt, direct, or contain abbreviations or verbiage that are unfamiliar. Medical  documents are intended to carry relevant information, facts as evident, and the clinical opinion of the practitioner.    Also, please note that this report has been produced using speech recognition software and may contain errors related to that system including, but not limited to, errors in grammar, punctuation, and spelling, as well as words and phrases that possibly may have been recognized inappropriately.  If there are any questions or concerns, contact the dictating provider for clarification.      Juan C Crandall MD  1/31/2024  12:04 PM

## 2024-01-31 NOTE — PATIENT INSTRUCTIONS
Continue  santyl for enzymatic debridement.   Serial debridements as needed.   Followup with surgeon as recommended  Stress reduction methods    Wound Cleaning and Dressings:    Wash your hands with soap and water. Always wear gloves while changing dressings. Donot touch wound / andie-wound skin with un-gloved hands. Remove old dressing, discard and place into trash.    DRESSINGS: SANTYL / moist GAUZE    Miscellaneous Instructions:  Supplement with a daily multivitamin   Increase protein intake / consider protein supplements - see below    DIETARY MODIFICATIONS TO HELP WITH WOUND HEALING:    Protein: Meats, beans, eggs, milk and yogurt particularly Greek yogurt), tofu, soy nuts, soy protein products    Vitamin C: Citrus fruits and juices, strawberries, tomatoes, tomato juice, peppers, baked potatoes, spinach, broccoli, cauliflower, [place] sprouts, cabbage    Vitamin A: Dark green, leafy vegetables, orange or yellow vegetables, cantaloupe, fortified dairy products, liver, fortified cereals    Zinc: Fortified cereals, red meats, seafood    Consider Ran by [employer] (These are essential branch chain amino acids that help with tissue building and wound healing) and take 2 packets/day. you can order online at [employer] or [employer].    ADDITIONAL REMINDERS:    The treatment plan has been discussed at length with you and your provider. Follow all instructions carefully, it is very important. If you do not follow all instructions, you are at  risk of your wound not healing, infection, possible loss of limb and even end of life.  Please call the clinic during regular business hours ( 7:30 AM - 5:30 PM) if you notice increased bleeding, redness, warmth, pain or pus like drainage or start running a fever greater than 100.3.    For after hour emergencies, please call your primary physician or go to the nearest emergency room.

## 2024-01-31 NOTE — PROGRESS NOTES
Patient ID: Sarahi Russell is a 63 year old female.    Debridement Old surgical Right Breast   Wound 12/14/23 #1 Right breast Breast Right    Performed by: Juan C Walden MD  Authorized by: Juan C Walden MD      Consent   Consent obtained? verbal  Consent given by: patient  Risks discussed? procedural risks discussed    Debridement Details  Performed by: physician  Debridement type: surgical  Level of debridement: subcutaneous tissue  Pain control: lidocaine 4%    Pre-debridement measurements  Length (cm): 3.6  Width (cm): 3.3  Depth (cm): 0.4  Surface Area (cm^2): 11.88    Post-debridement measurements  Length (cm): 3.6  Width (cm): 3.3  Depth (cm): 1  Percent debrided: 60%  Surface Area (cm^2): 11.88  Area Debrided (cm^2): 7.13  Volume (cm^3): 11.88    Tissue and other material debrided: adipose and subcutaneous tissue  Devitalized tissue debrided: necrotic debris and slough  Instrument(s) utilized: blade, curette and forceps  Bleeding: medium  Hemostasis obtained with: pressure and silver nitrate  Procedural pain (0-10): 2  Post-procedural pain: 0   Response to treatment: procedure was tolerated well

## 2024-02-06 ENCOUNTER — HOSPITAL ENCOUNTER (OUTPATIENT)
Dept: WOUND CARE | Age: 64
Discharge: STILL ADMITTED OR ADMINISTRATIVE | End: 2024-02-06
Attending: INTERNAL MEDICINE

## 2024-02-06 VITALS
TEMPERATURE: 96.6 F | RESPIRATION RATE: 14 BRPM | DIASTOLIC BLOOD PRESSURE: 72 MMHG | SYSTOLIC BLOOD PRESSURE: 135 MMHG | HEART RATE: 81 BPM | OXYGEN SATURATION: 96 %

## 2024-02-06 DIAGNOSIS — S21.001A BREAST WOUND, RIGHT, INITIAL ENCOUNTER: Primary | ICD-10-CM

## 2024-02-06 DIAGNOSIS — T81.31XA SURGICAL WOUND DEHISCENCE, INITIAL ENCOUNTER: ICD-10-CM

## 2024-02-06 PROCEDURE — 87070 CULTURE OTHR SPECIMN AEROBIC: CPT | Performed by: INTERNAL MEDICINE

## 2024-02-06 PROCEDURE — 99204 OFFICE O/P NEW MOD 45 MIN: CPT

## 2024-02-06 RX ORDER — CONJUGATED ESTROGENS 0.62 MG/1
0.62 TABLET, FILM COATED ORAL DAILY
COMMUNITY

## 2024-02-06 RX ORDER — ESCITALOPRAM OXALATE 10 MG/1
10 TABLET ORAL DAILY
COMMUNITY
Start: 2023-07-17

## 2024-02-06 RX ORDER — SULFAMETHOXAZOLE AND TRIMETHOPRIM 800; 160 MG/1; MG/1
1 TABLET ORAL 2 TIMES DAILY
COMMUNITY
Start: 2024-01-25

## 2024-02-06 RX ORDER — DIAZEPAM 5 MG/1
5 TABLET ORAL NIGHTLY PRN
COMMUNITY
Start: 2023-07-17

## 2024-02-06 RX ORDER — NITROGLYCERIN 0.3 MG/1
0.3 TABLET SUBLINGUAL
COMMUNITY

## 2024-02-06 RX ORDER — GARLIC EXTRACT 500 MG
1 CAPSULE ORAL DAILY
COMMUNITY

## 2024-02-06 RX ORDER — ALBUTEROL SULFATE 90 UG/1
2 AEROSOL, METERED RESPIRATORY (INHALATION) EVERY 4 HOURS PRN
COMMUNITY

## 2024-02-06 RX ORDER — COLLAGENASE SANTYL 250 [ARB'U]/G
1 OINTMENT TOPICAL DAILY
COMMUNITY

## 2024-02-06 RX ORDER — BUPROPION HYDROCHLORIDE 150 MG/1
150 TABLET ORAL DAILY
COMMUNITY
Start: 2024-01-23 | End: 2025-01-22

## 2024-02-06 ASSESSMENT — PAIN SCALES - GENERAL: PAINLEVEL_OUTOF10: 4

## 2024-02-07 ENCOUNTER — APPOINTMENT (OUTPATIENT)
Dept: WOUND CARE | Facility: HOSPITAL | Age: 64
End: 2024-02-07
Attending: INTERNAL MEDICINE
Payer: COMMERCIAL

## 2024-02-07 ENCOUNTER — TELEPHONE (OUTPATIENT)
Dept: WOUND CARE | Facility: HOSPITAL | Age: 64
End: 2024-02-07

## 2024-02-07 NOTE — TELEPHONE ENCOUNTER
Patient has an appointment to see the physician at the wound care clinic. Patient was a no show. I tried calling her and left her a voicemail to see if she is coming for her appointment today.patient never return our phone call.

## 2024-02-09 ENCOUNTER — APPOINTMENT (OUTPATIENT)
Dept: WOUND CARE | Age: 64
End: 2024-02-09
Attending: FAMILY MEDICINE

## 2024-02-10 LAB
BACTERIA SPEC ANAEROBE+AEROBE CULT: ABNORMAL
BACTERIA SPEC ANAEROBE+AEROBE CULT: ABNORMAL
GRAM STN SPEC: ABNORMAL

## 2024-02-12 ENCOUNTER — HOSPITAL ENCOUNTER (OUTPATIENT)
Dept: WOUND CARE | Age: 64
Discharge: STILL ADMITTED OR ADMINISTRATIVE | End: 2024-02-12
Attending: INTERNAL MEDICINE

## 2024-02-12 VITALS — TEMPERATURE: 97 F

## 2024-02-12 DIAGNOSIS — S21.001A BREAST WOUND, RIGHT, INITIAL ENCOUNTER: Primary | ICD-10-CM

## 2024-02-12 PROCEDURE — 99213 OFFICE O/P EST LOW 20 MIN: CPT

## 2024-02-12 ASSESSMENT — PAIN SCALES - GENERAL: PAINLEVEL_OUTOF10: 3

## 2024-02-14 ENCOUNTER — APPOINTMENT (OUTPATIENT)
Dept: WOUND CARE | Facility: HOSPITAL | Age: 64
End: 2024-02-14
Attending: INTERNAL MEDICINE
Payer: COMMERCIAL

## 2024-02-21 ENCOUNTER — APPOINTMENT (OUTPATIENT)
Dept: WOUND CARE | Facility: HOSPITAL | Age: 64
End: 2024-02-21
Attending: INTERNAL MEDICINE
Payer: COMMERCIAL

## 2024-02-28 ENCOUNTER — APPOINTMENT (OUTPATIENT)
Dept: WOUND CARE | Facility: HOSPITAL | Age: 64
End: 2024-02-28
Attending: INTERNAL MEDICINE
Payer: COMMERCIAL

## 2024-03-01 ENCOUNTER — HOSPITAL ENCOUNTER (OUTPATIENT)
Dept: WOUND CARE | Age: 64
Discharge: HOME | End: 2024-03-01
Attending: INTERNAL MEDICINE

## 2024-03-01 VITALS — TEMPERATURE: 96.8 F

## 2024-03-01 DIAGNOSIS — S21.001A BREAST WOUND, RIGHT, INITIAL ENCOUNTER: Primary | ICD-10-CM

## 2024-03-01 PROCEDURE — 99213 OFFICE O/P EST LOW 20 MIN: CPT

## 2024-03-01 PROCEDURE — 99203 OFFICE O/P NEW LOW 30 MIN: CPT | Performed by: FAMILY MEDICINE

## 2024-03-01 ASSESSMENT — PAIN SCALES - GENERAL: PAINLEVEL_OUTOF10: 0

## 2024-03-06 ENCOUNTER — APPOINTMENT (OUTPATIENT)
Dept: WOUND CARE | Facility: HOSPITAL | Age: 64
End: 2024-03-06
Attending: INTERNAL MEDICINE
Payer: COMMERCIAL

## 2024-03-13 ENCOUNTER — EXTERNAL RECORD (OUTPATIENT)
Dept: HEALTH INFORMATION MANAGEMENT | Facility: OTHER | Age: 64
End: 2024-03-13

## 2024-03-21 ENCOUNTER — ANESTHESIA EVENT (OUTPATIENT)
Dept: SURGERY | Age: 64
End: 2024-03-21

## 2024-03-21 RX ORDER — MULTIVIT WITH MINERALS/LUTEIN
1000 TABLET ORAL
COMMUNITY

## 2024-03-21 ASSESSMENT — ACTIVITIES OF DAILY LIVING (ADL)
ADL_SHORT_OF_BREATH: NO
ADL_SCORE: 12
NEEDS_ASSIST: NO
SENSORY_SUPPORT_DEVICES: EYEGLASSES
ADL_BEFORE_ADMISSION: INDEPENDENT

## 2024-03-22 ENCOUNTER — ANESTHESIA (OUTPATIENT)
Dept: SURGERY | Age: 64
End: 2024-03-22

## 2024-03-22 ENCOUNTER — EXTERNAL RECORD (OUTPATIENT)
Dept: HEALTH INFORMATION MANAGEMENT | Facility: OTHER | Age: 64
End: 2024-03-22

## 2024-03-22 ENCOUNTER — HOSPITAL ENCOUNTER (OUTPATIENT)
Age: 64
Discharge: HOME | End: 2024-03-22
Attending: SPECIALIST | Admitting: SPECIALIST

## 2024-03-22 DIAGNOSIS — S21.001A BREAST WOUND, RIGHT, INITIAL ENCOUNTER: ICD-10-CM

## 2024-03-22 DIAGNOSIS — G89.18 POSTOPERATIVE PAIN: Primary | ICD-10-CM

## 2024-03-22 PROCEDURE — 10002800 HB RX 250 W HCPCS: Performed by: SPECIALIST

## 2024-03-22 PROCEDURE — 10002801 HB RX 250 W/O HCPCS: Performed by: SPECIALIST

## 2024-03-22 PROCEDURE — 10002807 HB RX 258: Performed by: SPECIALIST

## 2024-03-22 PROCEDURE — 10006023 HB SUPPLY 272: Performed by: SPECIALIST

## 2024-03-22 PROCEDURE — 10002801 HB RX 250 W/O HCPCS: Performed by: ANESTHESIOLOGY

## 2024-03-22 PROCEDURE — 10002807 HB RX 258: Performed by: ANESTHESIOLOGY

## 2024-03-22 PROCEDURE — 88305 TISSUE EXAM BY PATHOLOGIST: CPT | Performed by: SPECIALIST

## 2024-03-22 PROCEDURE — 10002807 HB RX 258: Performed by: STUDENT IN AN ORGANIZED HEALTH CARE EDUCATION/TRAINING PROGRAM

## 2024-03-22 PROCEDURE — 13000034 HB BASIC CASE  S/U +1ST 15 MIN: Performed by: SPECIALIST

## 2024-03-22 PROCEDURE — 10002803 HB RX 637: Performed by: STUDENT IN AN ORGANIZED HEALTH CARE EDUCATION/TRAINING PROGRAM

## 2024-03-22 PROCEDURE — 13000035 HB BASIC CASE EA ADD MINUTE: Performed by: SPECIALIST

## 2024-03-22 PROCEDURE — 10002800 HB RX 250 W HCPCS: Performed by: ANESTHESIOLOGY

## 2024-03-22 PROCEDURE — 10004451 HB PACU RECOVERY 1ST 30 MINUTES: Performed by: SPECIALIST

## 2024-03-22 PROCEDURE — 10002803 HB RX 637: Performed by: ANESTHESIOLOGY

## 2024-03-22 PROCEDURE — 13000003 HB ANESTHESIA  GENERAL EA ADD MINUTE: Performed by: SPECIALIST

## 2024-03-22 PROCEDURE — 13000002 HB ANESTHESIA  GENERAL  S/U + 1ST 15 MIN: Performed by: SPECIALIST

## 2024-03-22 PROCEDURE — 10002801 HB RX 250 W/O HCPCS

## 2024-03-22 PROCEDURE — 13000001 HB PHASE II RECOVERY EA 30 MINUTES: Performed by: SPECIALIST

## 2024-03-22 RX ORDER — DEXAMETHASONE SODIUM PHOSPHATE 4 MG/ML
INJECTION, SOLUTION INTRA-ARTICULAR; INTRALESIONAL; INTRAMUSCULAR; INTRAVENOUS; SOFT TISSUE PRN
Status: DISCONTINUED | OUTPATIENT
Start: 2024-03-22 | End: 2024-03-22

## 2024-03-22 RX ORDER — HYDRALAZINE HYDROCHLORIDE 20 MG/ML
5 INJECTION INTRAMUSCULAR; INTRAVENOUS EVERY 10 MIN PRN
Status: DISCONTINUED | OUTPATIENT
Start: 2024-03-22 | End: 2024-03-22 | Stop reason: HOSPADM

## 2024-03-22 RX ORDER — DROPERIDOL 2.5 MG/ML
0.62 INJECTION, SOLUTION INTRAMUSCULAR; INTRAVENOUS
Status: DISCONTINUED | OUTPATIENT
Start: 2024-03-22 | End: 2024-03-22 | Stop reason: HOSPADM

## 2024-03-22 RX ORDER — DIPHENHYDRAMINE HYDROCHLORIDE 50 MG/ML
25 INJECTION INTRAMUSCULAR; INTRAVENOUS EVERY 4 HOURS PRN
Status: DISCONTINUED | OUTPATIENT
Start: 2024-03-22 | End: 2024-03-22 | Stop reason: HOSPADM

## 2024-03-22 RX ORDER — NALOXONE HCL 0.4 MG/ML
0.2 VIAL (ML) INJECTION EVERY 5 MIN PRN
Status: DISCONTINUED | OUTPATIENT
Start: 2024-03-22 | End: 2024-03-22 | Stop reason: HOSPADM

## 2024-03-22 RX ORDER — EPHEDRINE SULFATE/0.9% NACL/PF 50 MG/10ML
SYRINGE (ML) INTRAVENOUS PRN
Status: DISCONTINUED | OUTPATIENT
Start: 2024-03-22 | End: 2024-03-22

## 2024-03-22 RX ORDER — SODIUM CHLORIDE 9 MG/ML
INJECTION, SOLUTION INTRAVENOUS CONTINUOUS
Status: DISCONTINUED | OUTPATIENT
Start: 2024-03-22 | End: 2024-03-22 | Stop reason: HOSPADM

## 2024-03-22 RX ORDER — NICOTINE POLACRILEX 4 MG
30 LOZENGE BUCCAL
Status: DISCONTINUED | OUTPATIENT
Start: 2024-03-22 | End: 2024-03-22 | Stop reason: HOSPADM

## 2024-03-22 RX ORDER — PROPOFOL 10 MG/ML
INJECTION, EMULSION INTRAVENOUS PRN
Status: DISCONTINUED | OUTPATIENT
Start: 2024-03-22 | End: 2024-03-22

## 2024-03-22 RX ORDER — SCOLOPAMINE TRANSDERMAL SYSTEM 1 MG/1
1 PATCH, EXTENDED RELEASE TRANSDERMAL PRN
Status: DISCONTINUED | OUTPATIENT
Start: 2024-03-22 | End: 2024-03-22 | Stop reason: HOSPADM

## 2024-03-22 RX ORDER — PROMETHAZINE HYDROCHLORIDE 25 MG/1
25 TABLET ORAL EVERY 6 HOURS PRN
Status: DISCONTINUED | OUTPATIENT
Start: 2024-03-22 | End: 2024-03-22 | Stop reason: HOSPADM

## 2024-03-22 RX ORDER — 0.9 % SODIUM CHLORIDE 0.9 %
2 VIAL (ML) INJECTION EVERY 12 HOURS SCHEDULED
Status: DISCONTINUED | OUTPATIENT
Start: 2024-03-22 | End: 2024-03-22 | Stop reason: HOSPADM

## 2024-03-22 RX ORDER — TRANEXAMIC ACID 10 MG/ML
1000 INJECTION, SOLUTION INTRAVENOUS
Status: COMPLETED | OUTPATIENT
Start: 2024-03-22 | End: 2024-03-22

## 2024-03-22 RX ORDER — CEPHALEXIN 500 MG/1
500 CAPSULE ORAL 3 TIMES DAILY
Qty: 15 CAPSULE | Refills: 0 | Status: SHIPPED | OUTPATIENT
Start: 2024-03-22 | End: 2024-03-27

## 2024-03-22 RX ORDER — SODIUM CHLORIDE, SODIUM LACTATE, POTASSIUM CHLORIDE, CALCIUM CHLORIDE 600; 310; 30; 20 MG/100ML; MG/100ML; MG/100ML; MG/100ML
INJECTION, SOLUTION INTRAVENOUS CONTINUOUS
Status: DISCONTINUED | OUTPATIENT
Start: 2024-03-22 | End: 2024-03-22 | Stop reason: HOSPADM

## 2024-03-22 RX ORDER — DIPHENHYDRAMINE HYDROCHLORIDE 50 MG/ML
12.5 INJECTION INTRAMUSCULAR; INTRAVENOUS
Status: DISCONTINUED | OUTPATIENT
Start: 2024-03-22 | End: 2024-03-22 | Stop reason: HOSPADM

## 2024-03-22 RX ORDER — HUMAN INSULIN 100 [IU]/ML
INJECTION, SOLUTION SUBCUTANEOUS
Status: DISCONTINUED | OUTPATIENT
Start: 2024-03-22 | End: 2024-03-22 | Stop reason: HOSPADM

## 2024-03-22 RX ORDER — METOPROLOL SUCCINATE 25 MG/1
25 TABLET, EXTENDED RELEASE ORAL
Status: DISCONTINUED | OUTPATIENT
Start: 2024-03-22 | End: 2024-03-22 | Stop reason: HOSPADM

## 2024-03-22 RX ORDER — ACETAMINOPHEN 500 MG
1000 TABLET ORAL
Status: DISCONTINUED | OUTPATIENT
Start: 2024-03-22 | End: 2024-03-22 | Stop reason: HOSPADM

## 2024-03-22 RX ORDER — LIDOCAINE HYDROCHLORIDE 20 MG/ML
INJECTION, SOLUTION INFILTRATION; PERINEURAL PRN
Status: DISCONTINUED | OUTPATIENT
Start: 2024-03-22 | End: 2024-03-22

## 2024-03-22 RX ORDER — LIDOCAINE HYDROCHLORIDE 10 MG/ML
5-10 INJECTION, SOLUTION EPIDURAL; INFILTRATION; INTRACAUDAL; PERINEURAL PRN
Status: DISCONTINUED | OUTPATIENT
Start: 2024-03-22 | End: 2024-03-22 | Stop reason: HOSPADM

## 2024-03-22 RX ORDER — GABAPENTIN 300 MG/1
300 CAPSULE ORAL
Status: DISCONTINUED | OUTPATIENT
Start: 2024-03-22 | End: 2024-03-22 | Stop reason: HOSPADM

## 2024-03-22 RX ORDER — SODIUM CHLORIDE, SODIUM LACTATE, POTASSIUM CHLORIDE, CALCIUM CHLORIDE 600; 310; 30; 20 MG/100ML; MG/100ML; MG/100ML; MG/100ML
INJECTION, SOLUTION INTRAVENOUS CONTINUOUS PRN
Status: DISCONTINUED | OUTPATIENT
Start: 2024-03-22 | End: 2024-03-22

## 2024-03-22 RX ORDER — PALONOSETRON 0.05 MG/ML
0.25 INJECTION, SOLUTION INTRAVENOUS ONCE
Status: DISCONTINUED | OUTPATIENT
Start: 2024-03-22 | End: 2024-03-22 | Stop reason: HOSPADM

## 2024-03-22 RX ORDER — DEXTROSE MONOHYDRATE 50 MG/ML
INJECTION, SOLUTION INTRAVENOUS CONTINUOUS PRN
Status: DISCONTINUED | OUTPATIENT
Start: 2024-03-22 | End: 2024-03-22 | Stop reason: HOSPADM

## 2024-03-22 RX ORDER — APREPITANT 40 MG/1
40 CAPSULE ORAL ONCE
Status: COMPLETED | OUTPATIENT
Start: 2024-03-22 | End: 2024-03-22

## 2024-03-22 RX ORDER — HYDROCODONE BITARTRATE AND ACETAMINOPHEN 5; 325 MG/1; MG/1
1 TABLET ORAL
Status: COMPLETED | OUTPATIENT
Start: 2024-03-22 | End: 2024-03-22

## 2024-03-22 RX ORDER — ACETAMINOPHEN 325 MG/1
650 TABLET ORAL
Status: DISCONTINUED | OUTPATIENT
Start: 2024-03-22 | End: 2024-03-22 | Stop reason: HOSPADM

## 2024-03-22 RX ORDER — PALONOSETRON 0.05 MG/ML
INJECTION, SOLUTION INTRAVENOUS PRN
Status: DISCONTINUED | OUTPATIENT
Start: 2024-03-22 | End: 2024-03-22

## 2024-03-22 RX ORDER — DEXTROSE MONOHYDRATE 25 G/50ML
25 INJECTION, SOLUTION INTRAVENOUS PRN
Status: DISCONTINUED | OUTPATIENT
Start: 2024-03-22 | End: 2024-03-22 | Stop reason: HOSPADM

## 2024-03-22 RX ORDER — HYDROCODONE BITARTRATE AND ACETAMINOPHEN 5; 325 MG/1; MG/1
1 TABLET ORAL EVERY 6 HOURS PRN
Qty: 20 TABLET | Refills: 0 | Status: SHIPPED | OUTPATIENT
Start: 2024-03-22

## 2024-03-22 RX ADMIN — EPHEDRINE SULFATE 10 MG: 5 INJECTION INTRAVENOUS at 09:32

## 2024-03-22 RX ADMIN — TRANEXAMIC ACID 1000 MG: 10 INJECTION, SOLUTION INTRAVENOUS at 09:00

## 2024-03-22 RX ADMIN — PROPOFOL 200 MG: 10 INJECTION, EMULSION INTRAVENOUS at 09:10

## 2024-03-22 RX ADMIN — APREPITANT 40 MG: 40 CAPSULE ORAL at 08:44

## 2024-03-22 RX ADMIN — FENTANYL CITRATE 25 MCG: 50 INJECTION INTRAMUSCULAR; INTRAVENOUS at 10:42

## 2024-03-22 RX ADMIN — HYDROCODONE BITARTRATE AND ACETAMINOPHEN 1 TABLET: 5; 325 TABLET ORAL at 12:09

## 2024-03-22 RX ADMIN — CEFAZOLIN SODIUM 2000 MG: 300 INJECTION, POWDER, LYOPHILIZED, FOR SOLUTION INTRAVENOUS at 09:16

## 2024-03-22 RX ADMIN — SCOPALAMINE 1 PATCH: 1 PATCH, EXTENDED RELEASE TRANSDERMAL at 08:35

## 2024-03-22 RX ADMIN — LIDOCAINE HYDROCHLORIDE 5 ML: 20 INJECTION, SOLUTION INFILTRATION; PERINEURAL at 09:03

## 2024-03-22 RX ADMIN — SODIUM CHLORIDE, POTASSIUM CHLORIDE, SODIUM LACTATE AND CALCIUM CHLORIDE: 600; 310; 30; 20 INJECTION, SOLUTION INTRAVENOUS at 08:32

## 2024-03-22 RX ADMIN — PALONOSETRON HYDROCHLORIDE 0.25 MG: 0.25 INJECTION INTRAVENOUS at 09:10

## 2024-03-22 RX ADMIN — EPHEDRINE SULFATE 10 MG: 5 INJECTION INTRAVENOUS at 09:28

## 2024-03-22 RX ADMIN — FENTANYL CITRATE 25 MCG: 50 INJECTION INTRAMUSCULAR; INTRAVENOUS at 10:59

## 2024-03-22 RX ADMIN — EPHEDRINE SULFATE 10 MG: 5 INJECTION INTRAVENOUS at 09:54

## 2024-03-22 RX ADMIN — SODIUM CHLORIDE, POTASSIUM CHLORIDE, SODIUM LACTATE AND CALCIUM CHLORIDE: 600; 310; 30; 20 INJECTION, SOLUTION INTRAVENOUS at 09:10

## 2024-03-22 RX ADMIN — DEXAMETHASONE SODIUM PHOSPHATE 8 MG: 4 INJECTION INTRA-ARTICULAR; INTRALESIONAL; INTRAMUSCULAR; INTRAVENOUS; SOFT TISSUE at 09:10

## 2024-03-22 RX ADMIN — FENTANYL CITRATE 50 MCG: 50 INJECTION INTRAMUSCULAR; INTRAVENOUS at 09:03

## 2024-03-22 RX ADMIN — FENTANYL CITRATE 50 MCG: 50 INJECTION INTRAMUSCULAR; INTRAVENOUS at 09:43

## 2024-03-22 ASSESSMENT — PAIN SCALES - GENERAL
PAINLEVEL_OUTOF10: 5
PAINLEVEL_OUTOF10: 6
PAINLEVEL_OUTOF10: 6
PAINLEVEL_OUTOF10: 5
PAINLEVEL_OUTOF10: 6
PAINLEVEL_OUTOF10: 4
PAINLEVEL_OUTOF10: 4
PAINLEVEL_OUTOF10: 3
PAINLEVEL_OUTOF10: 6
PAINLEVEL_OUTOF10: 3

## 2024-03-23 VITALS
DIASTOLIC BLOOD PRESSURE: 63 MMHG | SYSTOLIC BLOOD PRESSURE: 122 MMHG | HEIGHT: 68 IN | HEART RATE: 90 BPM | RESPIRATION RATE: 18 BRPM | OXYGEN SATURATION: 96 % | TEMPERATURE: 96.8 F | WEIGHT: 142.64 LBS | BODY MASS INDEX: 21.62 KG/M2

## 2024-03-26 LAB
ASR DISCLAIMER: NORMAL
CASE RPRT: NORMAL
CLINICAL INFO: NORMAL
PATH REPORT.FINAL DX SPEC: NORMAL
PATH REPORT.GROSS SPEC: NORMAL

## 2024-04-17 ENCOUNTER — EXTERNAL RECORD (OUTPATIENT)
Dept: SURGERY | Age: 64
End: 2024-04-17

## 2024-04-17 ENCOUNTER — TELEPHONE (OUTPATIENT)
Dept: SURGERY | Age: 64
End: 2024-04-17

## 2024-04-17 ENCOUNTER — EXTERNAL RECORD (OUTPATIENT)
Dept: HEALTH INFORMATION MANAGEMENT | Facility: OTHER | Age: 64
End: 2024-04-17

## 2024-04-19 ENCOUNTER — TELEPHONE (OUTPATIENT)
Dept: SURGERY | Age: 64
End: 2024-04-19

## 2024-05-07 RX ORDER — SILVER SULFADIAZINE 1 %
CREAM (GRAM) TOPICAL
COMMUNITY
Start: 2024-02-12 | End: 2024-05-08

## 2024-05-07 RX ORDER — POLYETHYLENE GLYCOL 3350, SODIUM CHLORIDE, SODIUM BICARBONATE, POTASSIUM CHLORIDE 420; 11.2; 5.72; 1.48 G/4L; G/4L; G/4L; G/4L
POWDER, FOR SOLUTION ORAL
COMMUNITY
Start: 2024-02-20 | End: 2024-05-08

## 2024-05-07 RX ORDER — BUPROPION HYDROCHLORIDE 75 MG/1
TABLET ORAL
COMMUNITY

## 2024-05-07 RX ORDER — CLINDAMYCIN HYDROCHLORIDE 300 MG/1
300 CAPSULE ORAL
COMMUNITY
Start: 2024-02-12 | End: 2024-05-08

## 2024-05-08 ENCOUNTER — OFFICE VISIT (OUTPATIENT)
Dept: SURGERY | Age: 64
End: 2024-05-08
Attending: SURGERY

## 2024-05-08 VITALS
HEIGHT: 68 IN | BODY MASS INDEX: 21.76 KG/M2 | HEART RATE: 104 BPM | TEMPERATURE: 98.1 F | SYSTOLIC BLOOD PRESSURE: 96 MMHG | DIASTOLIC BLOOD PRESSURE: 64 MMHG | WEIGHT: 143.6 LBS

## 2024-05-08 DIAGNOSIS — Z91.89 AT HIGH RISK FOR BREAST CANCER: Primary | ICD-10-CM

## 2024-05-08 DIAGNOSIS — N60.99 ATYPICAL DUCTAL HYPERPLASIA OF BREAST: ICD-10-CM

## 2024-05-08 DIAGNOSIS — Z98.890 S/P BILATERAL BREAST REDUCTION: ICD-10-CM

## 2024-05-08 PROCEDURE — 99202 OFFICE O/P NEW SF 15 MIN: CPT

## 2024-05-08 PROCEDURE — 99245 OFF/OP CONSLTJ NEW/EST HI 55: CPT | Performed by: SURGERY

## 2024-05-08 ASSESSMENT — PAIN SCALES - GENERAL: PAINLEVEL: 2

## 2024-05-10 ENCOUNTER — TELEPHONE (OUTPATIENT)
Dept: FAMILY MEDICINE | Age: 64
End: 2024-05-10

## 2024-05-11 ENCOUNTER — E-ADVICE (OUTPATIENT)
Dept: OTHER | Age: 64
End: 2024-05-11

## 2024-05-13 ENCOUNTER — OFF PREMISE (OUTPATIENT)
Dept: SURGERY | Age: 64
End: 2024-05-13

## 2024-05-15 ENCOUNTER — APPOINTMENT (OUTPATIENT)
Dept: SURGERY | Age: 64
End: 2024-05-15

## 2024-05-17 ENCOUNTER — APPOINTMENT (OUTPATIENT)
Dept: MRI IMAGING | Age: 64
End: 2024-05-17
Attending: PHYSICIAN ASSISTANT

## 2024-05-23 ENCOUNTER — APPOINTMENT (OUTPATIENT)
Dept: SURGERY | Age: 64
End: 2024-05-23

## 2024-05-28 ENCOUNTER — E-ADVICE (OUTPATIENT)
Dept: OTHER | Age: 64
End: 2024-05-28

## 2024-06-04 ENCOUNTER — HOSPITAL ENCOUNTER (OUTPATIENT)
Dept: CT IMAGING | Age: 64
Discharge: HOME | End: 2024-06-04
Attending: OBSTETRICS & GYNECOLOGY

## 2024-06-04 ENCOUNTER — APPOINTMENT (OUTPATIENT)
Dept: MRI IMAGING | Age: 64
End: 2024-06-04
Attending: PHYSICIAN ASSISTANT

## 2024-06-04 ENCOUNTER — HOSPITAL ENCOUNTER (OUTPATIENT)
Dept: MRI IMAGING | Age: 64
Discharge: HOME | End: 2024-06-04
Attending: PHYSICIAN ASSISTANT

## 2024-06-04 DIAGNOSIS — N60.99 ATYPICAL DUCTAL HYPERPLASIA OF BREAST: ICD-10-CM

## 2024-06-04 DIAGNOSIS — Z91.89 AT HIGH RISK FOR BREAST CANCER: ICD-10-CM

## 2024-06-04 DIAGNOSIS — N63.10 BILATERAL BREAST LUMP: ICD-10-CM

## 2024-06-04 DIAGNOSIS — Z98.890 S/P BILATERAL BREAST REDUCTION: ICD-10-CM

## 2024-06-04 DIAGNOSIS — N63.20 BILATERAL BREAST LUMP: ICD-10-CM

## 2024-06-04 PROCEDURE — A9585 GADOBUTROL INJECTION: HCPCS | Performed by: PHYSICIAN ASSISTANT

## 2024-06-04 PROCEDURE — 77049 MRI BREAST C-+ W/CAD BI: CPT

## 2024-06-04 PROCEDURE — 76642 ULTRASOUND BREAST LIMITED: CPT

## 2024-06-04 PROCEDURE — 77066 DX MAMMO INCL CAD BI: CPT

## 2024-06-04 PROCEDURE — 10002805 HB CONTRAST AGENT: Performed by: PHYSICIAN ASSISTANT

## 2024-06-04 RX ORDER — GADOBUTROL 604.72 MG/ML
7.5 INJECTION INTRAVENOUS ONCE
Status: COMPLETED | OUTPATIENT
Start: 2024-06-04 | End: 2024-06-04

## 2024-06-04 RX ADMIN — GADOBUTROL 7.5 ML: 604.72 INJECTION INTRAVENOUS at 09:31

## 2024-06-05 ENCOUNTER — TELEPHONE (OUTPATIENT)
Dept: SURGERY | Age: 64
End: 2024-06-05

## 2024-06-05 DIAGNOSIS — R92.8 ABNORMAL FINDINGS ON DIAGNOSTIC IMAGING OF BREAST: Primary | ICD-10-CM

## 2024-06-05 DIAGNOSIS — Z98.890 S/P BILATERAL BREAST REDUCTION: ICD-10-CM

## 2024-06-24 ENCOUNTER — TELEPHONE (OUTPATIENT)
Dept: SURGERY | Age: 64
End: 2024-06-24

## 2024-09-19 DIAGNOSIS — G89.18 POST-OP PAIN: Primary | ICD-10-CM

## 2024-09-19 RX ORDER — GABAPENTIN 300 MG/1
300 CAPSULE ORAL 3 TIMES DAILY
Qty: 30 CAPSULE | Refills: 0 | Status: SHIPPED | OUTPATIENT
Start: 2024-09-19

## 2024-09-19 RX ORDER — ONDANSETRON 4 MG/1
4 TABLET, FILM COATED ORAL EVERY 8 HOURS PRN
Qty: 8 TABLET | Refills: 0 | Status: SHIPPED | OUTPATIENT
Start: 2024-09-19

## 2024-09-19 RX ORDER — CEPHALEXIN 500 MG/1
500 CAPSULE ORAL 3 TIMES DAILY
Qty: 15 CAPSULE | Refills: 0 | Status: SHIPPED | OUTPATIENT
Start: 2024-09-19 | End: 2024-09-24

## 2024-09-19 RX ORDER — OXYCODONE HYDROCHLORIDE 5 MG/1
5 TABLET ORAL EVERY 6 HOURS PRN
Qty: 24 TABLET | Refills: 0 | Status: SHIPPED | OUTPATIENT
Start: 2024-09-19

## 2024-09-19 RX ORDER — ACETAMINOPHEN 500 MG
1000 TABLET ORAL EVERY 8 HOURS
Qty: 40 TABLET | Refills: 0 | Status: SHIPPED | OUTPATIENT
Start: 2024-09-19

## 2024-09-24 ASSESSMENT — ACTIVITIES OF DAILY LIVING (ADL)
SENSORY_SUPPORT_DEVICES: EYEGLASSES
ADL_BEFORE_ADMISSION: INDEPENDENT
ADL_SCORE: 12

## 2024-09-25 ENCOUNTER — ANESTHESIA EVENT (OUTPATIENT)
Dept: SURGERY | Age: 64
End: 2024-09-25

## 2024-09-26 ENCOUNTER — ANESTHESIA (OUTPATIENT)
Dept: SURGERY | Age: 64
End: 2024-09-26

## 2024-09-26 ENCOUNTER — HOSPITAL ENCOUNTER (OUTPATIENT)
Age: 64
Discharge: HOME | End: 2024-09-26
Attending: SPECIALIST | Admitting: SPECIALIST

## 2024-09-26 PROCEDURE — 10002800 HB RX 250 W HCPCS: Performed by: SPECIALIST

## 2024-09-26 PROCEDURE — 10002807 HB RX 258: Performed by: SPECIALIST

## 2024-09-26 PROCEDURE — 10002803 HB RX 637: Performed by: SPECIALIST

## 2024-09-26 PROCEDURE — 10006027 HB SUPPLY 278: Performed by: SPECIALIST

## 2024-09-26 PROCEDURE — 10002800 HB RX 250 W HCPCS

## 2024-09-26 PROCEDURE — 13000002 HB ANESTHESIA  GENERAL  S/U + 1ST 15 MIN: Performed by: SPECIALIST

## 2024-09-26 PROCEDURE — 10002801 HB RX 250 W/O HCPCS

## 2024-09-26 PROCEDURE — 10002807 HB RX 258: Performed by: ANESTHESIOLOGY

## 2024-09-26 PROCEDURE — 10002801 HB RX 250 W/O HCPCS: Performed by: SPECIALIST

## 2024-09-26 PROCEDURE — 10004651 HB RX, NO CHARGE ITEM: Performed by: SPECIALIST

## 2024-09-26 PROCEDURE — 13000037 HB COMPLEX CASE EACH ADD MINUTE: Performed by: SPECIALIST

## 2024-09-26 PROCEDURE — 88305 TISSUE EXAM BY PATHOLOGIST: CPT | Performed by: SPECIALIST

## 2024-09-26 PROCEDURE — 10002803 HB RX 637: Performed by: PHYSICIAN ASSISTANT

## 2024-09-26 PROCEDURE — 13000003 HB ANESTHESIA  GENERAL EA ADD MINUTE: Performed by: SPECIALIST

## 2024-09-26 PROCEDURE — 13000001 HB PHASE II RECOVERY EA 30 MINUTES: Performed by: SPECIALIST

## 2024-09-26 PROCEDURE — C1789 PROSTHESIS, BREAST, IMP: HCPCS | Performed by: SPECIALIST

## 2024-09-26 PROCEDURE — 10004451 HB PACU RECOVERY 1ST 30 MINUTES: Performed by: SPECIALIST

## 2024-09-26 PROCEDURE — 10006023 HB SUPPLY 272: Performed by: SPECIALIST

## 2024-09-26 PROCEDURE — 10004452 HB PACU ADDL 30 MINUTES: Performed by: SPECIALIST

## 2024-09-26 PROCEDURE — 13000036 HB COMPLEX  CASE S/U + 1ST 15 MIN: Performed by: SPECIALIST

## 2024-09-26 DEVICE — SMOOTH ROUND MODERATE CLASSIC PROFILE GEL-FILLED BREAST IMPLANT, 275CC  SMOOTH ROUND SILICONE
Type: IMPLANTABLE DEVICE | Site: BREAST | Status: FUNCTIONAL
Brand: MENTOR MEMORYGEL BREAST IMPLANT

## 2024-09-26 RX ORDER — GABAPENTIN 300 MG/1
300 CAPSULE ORAL
Status: COMPLETED | OUTPATIENT
Start: 2024-09-26 | End: 2024-09-26

## 2024-09-26 RX ORDER — PHENYLEPHRINE HYDROCHLORIDE 10 MG/ML
INJECTION, SOLUTION INTRAMUSCULAR; INTRAVENOUS; SUBCUTANEOUS PRN
Status: DISCONTINUED | OUTPATIENT
Start: 2024-09-26 | End: 2024-09-26

## 2024-09-26 RX ORDER — GLYCOPYRROLATE 0.2 MG/ML
INJECTION, SOLUTION INTRAMUSCULAR; INTRAVENOUS PRN
Status: DISCONTINUED | OUTPATIENT
Start: 2024-09-26 | End: 2024-09-26

## 2024-09-26 RX ORDER — SODIUM CHLORIDE, SODIUM LACTATE, POTASSIUM CHLORIDE, CALCIUM CHLORIDE 600; 310; 30; 20 MG/100ML; MG/100ML; MG/100ML; MG/100ML
INJECTION, SOLUTION INTRAVENOUS CONTINUOUS
Status: DISCONTINUED | OUTPATIENT
Start: 2024-09-26 | End: 2024-09-26 | Stop reason: HOSPADM

## 2024-09-26 RX ORDER — ONDANSETRON 2 MG/ML
4 INJECTION INTRAMUSCULAR; INTRAVENOUS
Status: DISCONTINUED | OUTPATIENT
Start: 2024-09-26 | End: 2024-09-26 | Stop reason: HOSPADM

## 2024-09-26 RX ORDER — ONDANSETRON 2 MG/ML
INJECTION INTRAMUSCULAR; INTRAVENOUS PRN
Status: DISCONTINUED | OUTPATIENT
Start: 2024-09-26 | End: 2024-09-26

## 2024-09-26 RX ORDER — DEXTROSE MONOHYDRATE 25 G/50ML
25 INJECTION, SOLUTION INTRAVENOUS PRN
Status: DISCONTINUED | OUTPATIENT
Start: 2024-09-26 | End: 2024-09-26 | Stop reason: HOSPADM

## 2024-09-26 RX ORDER — ONDANSETRON 4 MG/1
4 TABLET, ORALLY DISINTEGRATING ORAL EVERY 12 HOURS PRN
Status: DISCONTINUED | OUTPATIENT
Start: 2024-09-26 | End: 2024-09-26 | Stop reason: HOSPADM

## 2024-09-26 RX ORDER — 0.9 % SODIUM CHLORIDE 0.9 %
2 VIAL (ML) INJECTION EVERY 12 HOURS SCHEDULED
Status: DISCONTINUED | OUTPATIENT
Start: 2024-09-26 | End: 2024-09-26 | Stop reason: HOSPADM

## 2024-09-26 RX ORDER — ACETAMINOPHEN 500 MG
1000 TABLET ORAL
Status: COMPLETED | OUTPATIENT
Start: 2024-09-26 | End: 2024-09-26

## 2024-09-26 RX ORDER — NEOSTIGMINE METHYLSULFATE 4 MG/4 ML
SYRINGE (ML) INTRAVENOUS PRN
Status: DISCONTINUED | OUTPATIENT
Start: 2024-09-26 | End: 2024-09-26

## 2024-09-26 RX ORDER — TRANEXAMIC ACID 10 MG/ML
INJECTION, SOLUTION INTRAVENOUS PRN
Status: DISCONTINUED | OUTPATIENT
Start: 2024-09-26 | End: 2024-09-26

## 2024-09-26 RX ORDER — ROCURONIUM BROMIDE 10 MG/ML
INJECTION, SOLUTION INTRAVENOUS PRN
Status: DISCONTINUED | OUTPATIENT
Start: 2024-09-26 | End: 2024-09-26

## 2024-09-26 RX ORDER — LIDOCAINE HYDROCHLORIDE 20 MG/ML
INJECTION, SOLUTION INFILTRATION; PERINEURAL PRN
Status: DISCONTINUED | OUTPATIENT
Start: 2024-09-26 | End: 2024-09-26

## 2024-09-26 RX ORDER — METOCLOPRAMIDE 5 MG/1
5 TABLET ORAL EVERY 6 HOURS PRN
Status: DISCONTINUED | OUTPATIENT
Start: 2024-09-26 | End: 2024-09-26 | Stop reason: HOSPADM

## 2024-09-26 RX ORDER — OXYCODONE HYDROCHLORIDE 5 MG/1
10 TABLET ORAL EVERY 4 HOURS PRN
Status: DISCONTINUED | OUTPATIENT
Start: 2024-09-26 | End: 2024-09-26 | Stop reason: HOSPADM

## 2024-09-26 RX ORDER — SODIUM CHLORIDE 9 MG/ML
INJECTION, SOLUTION INTRAVENOUS CONTINUOUS
Status: DISCONTINUED | OUTPATIENT
Start: 2024-09-26 | End: 2024-09-26 | Stop reason: HOSPADM

## 2024-09-26 RX ORDER — TRANEXAMIC ACID 10 MG/ML
1000 INJECTION, SOLUTION INTRAVENOUS
Status: DISCONTINUED | OUTPATIENT
Start: 2024-09-26 | End: 2024-09-26 | Stop reason: HOSPADM

## 2024-09-26 RX ORDER — METOCLOPRAMIDE HYDROCHLORIDE 5 MG/ML
5 INJECTION INTRAMUSCULAR; INTRAVENOUS EVERY 6 HOURS PRN
Status: DISCONTINUED | OUTPATIENT
Start: 2024-09-26 | End: 2024-09-26 | Stop reason: HOSPADM

## 2024-09-26 RX ORDER — ONDANSETRON 2 MG/ML
4 INJECTION INTRAMUSCULAR; INTRAVENOUS EVERY 12 HOURS PRN
Status: DISCONTINUED | OUTPATIENT
Start: 2024-09-26 | End: 2024-09-26 | Stop reason: HOSPADM

## 2024-09-26 RX ORDER — ACETAMINOPHEN 325 MG/1
650 TABLET ORAL EVERY 4 HOURS PRN
Status: DISCONTINUED | OUTPATIENT
Start: 2024-09-26 | End: 2024-09-26 | Stop reason: HOSPADM

## 2024-09-26 RX ORDER — MIDAZOLAM HYDROCHLORIDE 1 MG/ML
INJECTION, SOLUTION INTRAMUSCULAR; INTRAVENOUS PRN
Status: DISCONTINUED | OUTPATIENT
Start: 2024-09-26 | End: 2024-09-26

## 2024-09-26 RX ORDER — HYDRALAZINE HYDROCHLORIDE 20 MG/ML
5 INJECTION INTRAMUSCULAR; INTRAVENOUS EVERY 10 MIN PRN
Status: DISCONTINUED | OUTPATIENT
Start: 2024-09-26 | End: 2024-09-26 | Stop reason: HOSPADM

## 2024-09-26 RX ORDER — ACETAMINOPHEN 650 MG/1
650 SUPPOSITORY RECTAL EVERY 4 HOURS PRN
Status: DISCONTINUED | OUTPATIENT
Start: 2024-09-26 | End: 2024-09-26 | Stop reason: HOSPADM

## 2024-09-26 RX ORDER — DROPERIDOL 2.5 MG/ML
0.62 INJECTION, SOLUTION INTRAMUSCULAR; INTRAVENOUS
Status: DISCONTINUED | OUTPATIENT
Start: 2024-09-26 | End: 2024-09-26 | Stop reason: HOSPADM

## 2024-09-26 RX ORDER — DEXAMETHASONE SODIUM PHOSPHATE 4 MG/ML
INJECTION, SOLUTION INTRA-ARTICULAR; INTRALESIONAL; INTRAMUSCULAR; INTRAVENOUS; SOFT TISSUE PRN
Status: DISCONTINUED | OUTPATIENT
Start: 2024-09-26 | End: 2024-09-26

## 2024-09-26 RX ORDER — PROPOFOL 10 MG/ML
INJECTION, EMULSION INTRAVENOUS PRN
Status: DISCONTINUED | OUTPATIENT
Start: 2024-09-26 | End: 2024-09-26

## 2024-09-26 RX ORDER — METOCLOPRAMIDE HYDROCHLORIDE 5 MG/ML
5 INJECTION INTRAMUSCULAR; INTRAVENOUS
Status: DISCONTINUED | OUTPATIENT
Start: 2024-09-26 | End: 2024-09-26 | Stop reason: HOSPADM

## 2024-09-26 RX ORDER — 0.9 % SODIUM CHLORIDE 0.9 %
2 VIAL (ML) INJECTION PRN
Status: DISCONTINUED | OUTPATIENT
Start: 2024-09-26 | End: 2024-09-26 | Stop reason: HOSPADM

## 2024-09-26 RX ORDER — NICOTINE POLACRILEX 4 MG
30 LOZENGE BUCCAL
Status: DISCONTINUED | OUTPATIENT
Start: 2024-09-26 | End: 2024-09-26 | Stop reason: HOSPADM

## 2024-09-26 RX ADMIN — MIDAZOLAM HYDROCHLORIDE 2 MG: 1 INJECTION, SOLUTION INTRAMUSCULAR; INTRAVENOUS at 07:35

## 2024-09-26 RX ADMIN — LIDOCAINE HYDROCHLORIDE 5 ML: 20 INJECTION, SOLUTION INFILTRATION; PERINEURAL at 07:37

## 2024-09-26 RX ADMIN — ROCURONIUM BROMIDE 35 MG: 10 INJECTION INTRAVENOUS at 07:37

## 2024-09-26 RX ADMIN — FENTANYL CITRATE 50 MCG: 50 INJECTION INTRAMUSCULAR; INTRAVENOUS at 07:35

## 2024-09-26 RX ADMIN — FENTANYL CITRATE 25 MCG: 50 INJECTION INTRAMUSCULAR; INTRAVENOUS at 09:40

## 2024-09-26 RX ADMIN — FENTANYL CITRATE 25 MCG: 50 INJECTION INTRAMUSCULAR; INTRAVENOUS at 09:30

## 2024-09-26 RX ADMIN — ACETAMINOPHEN 1000 MG: 500 TABLET ORAL at 06:45

## 2024-09-26 RX ADMIN — OXYCODONE HYDROCHLORIDE 10 MG: 5 TABLET ORAL at 10:24

## 2024-09-26 RX ADMIN — SODIUM CHLORIDE, POTASSIUM CHLORIDE, SODIUM LACTATE AND CALCIUM CHLORIDE: 600; 310; 30; 20 INJECTION, SOLUTION INTRAVENOUS at 09:00

## 2024-09-26 RX ADMIN — Medication 4 MG: at 08:58

## 2024-09-26 RX ADMIN — SODIUM CHLORIDE, POTASSIUM CHLORIDE, SODIUM LACTATE AND CALCIUM CHLORIDE: 600; 310; 30; 20 INJECTION, SOLUTION INTRAVENOUS at 06:46

## 2024-09-26 RX ADMIN — ROCURONIUM BROMIDE 10 MG: 10 INJECTION INTRAVENOUS at 08:23

## 2024-09-26 RX ADMIN — CEFAZOLIN SODIUM 2000 MG: 300 INJECTION, POWDER, LYOPHILIZED, FOR SOLUTION INTRAVENOUS at 07:47

## 2024-09-26 RX ADMIN — PHENYLEPHRINE HYDROCHLORIDE 80 MCG: 10 INJECTION INTRAVENOUS at 07:46

## 2024-09-26 RX ADMIN — FENTANYL CITRATE 50 MCG: 50 INJECTION INTRAMUSCULAR; INTRAVENOUS at 08:46

## 2024-09-26 RX ADMIN — TRANEXAMIC ACID 1000 MG: 10 INJECTION, SOLUTION INTRAVENOUS at 07:35

## 2024-09-26 RX ADMIN — GABAPENTIN 300 MG: 300 CAPSULE ORAL at 06:45

## 2024-09-26 RX ADMIN — ONDANSETRON 4 MG: 2 INJECTION INTRAMUSCULAR; INTRAVENOUS at 08:48

## 2024-09-26 RX ADMIN — DEXAMETHASONE SODIUM PHOSPHATE 10 MG: 4 INJECTION INTRA-ARTICULAR; INTRALESIONAL; INTRAMUSCULAR; INTRAVENOUS; SOFT TISSUE at 07:55

## 2024-09-26 RX ADMIN — PROPOFOL 150 MG: 10 INJECTION, EMULSION INTRAVENOUS at 07:37

## 2024-09-26 RX ADMIN — GLYCOPYRROLATE 0.6 MG: 0.2 INJECTION, SOLUTION INTRAMUSCULAR; INTRAVENOUS at 08:58

## 2024-09-26 SDOH — SOCIAL STABILITY: SOCIAL INSECURITY: RISK FACTORS: PULMONARY DISEASE

## 2024-09-26 ASSESSMENT — PAIN SCALES - GENERAL
PAINLEVEL_OUTOF10: 6
PAINLEVEL_OUTOF10: 2
PAINLEVEL_OUTOF10: 0
PAINLEVEL_OUTOF10: 3
PAINLEVEL_OUTOF10: 3
PAINLEVEL_OUTOF10: 5
PAINLEVEL_OUTOF10: 6
PAINLEVEL_OUTOF10: 4
PAINLEVEL_OUTOF10: 5
PAINLEVEL_OUTOF10: 6
PAINLEVEL_OUTOF10: 3

## 2024-09-27 VITALS
HEART RATE: 78 BPM | OXYGEN SATURATION: 94 % | RESPIRATION RATE: 16 BRPM | WEIGHT: 148.15 LBS | HEIGHT: 68 IN | SYSTOLIC BLOOD PRESSURE: 118 MMHG | TEMPERATURE: 96.8 F | BODY MASS INDEX: 22.45 KG/M2 | DIASTOLIC BLOOD PRESSURE: 62 MMHG

## 2024-10-29 RX ORDER — ALBUTEROL SULFATE 90 UG/1
INHALANT RESPIRATORY (INHALATION)
COMMUNITY
Start: 2024-07-08

## 2024-10-29 RX ORDER — TRAZODONE HYDROCHLORIDE 50 MG/1
50 TABLET, FILM COATED ORAL DAILY
COMMUNITY
Start: 2024-07-08 | End: 2024-11-06

## 2024-11-01 ENCOUNTER — HOSPITAL ENCOUNTER (OUTPATIENT)
Dept: MRI IMAGING | Age: 64
End: 2024-11-01
Attending: PHYSICIAN ASSISTANT

## 2024-11-01 DIAGNOSIS — R92.8 ABNORMAL FINDINGS ON DIAGNOSTIC IMAGING OF BREAST: ICD-10-CM

## 2024-11-01 DIAGNOSIS — Z98.890 S/P BILATERAL BREAST REDUCTION: ICD-10-CM

## 2024-11-01 PROCEDURE — A9585 GADOBUTROL INJECTION: HCPCS | Performed by: PHYSICIAN ASSISTANT

## 2024-11-01 PROCEDURE — C8937 CAD BREAST MRI: HCPCS

## 2024-11-01 PROCEDURE — 10002805 HB CONTRAST AGENT: Performed by: PHYSICIAN ASSISTANT

## 2024-11-01 RX ORDER — GADOBUTROL 604.72 MG/ML
7.5 INJECTION INTRAVENOUS ONCE
Status: COMPLETED | OUTPATIENT
Start: 2024-11-01 | End: 2024-11-01

## 2024-11-01 RX ADMIN — GADOBUTROL 7.5 ML: 604.72 INJECTION INTRAVENOUS at 11:59

## 2024-11-04 ENCOUNTER — TELEPHONE (OUTPATIENT)
Dept: SURGERY | Age: 64
End: 2024-11-04

## 2024-11-06 ENCOUNTER — OFFICE VISIT (OUTPATIENT)
Dept: SURGERY | Age: 64
End: 2024-11-06
Attending: SURGERY

## 2024-11-06 VITALS
WEIGHT: 152.8 LBS | RESPIRATION RATE: 16 BRPM | TEMPERATURE: 97.6 F | SYSTOLIC BLOOD PRESSURE: 110 MMHG | HEIGHT: 68 IN | DIASTOLIC BLOOD PRESSURE: 72 MMHG | BODY MASS INDEX: 23.16 KG/M2 | HEART RATE: 90 BPM | OXYGEN SATURATION: 95 %

## 2024-11-06 DIAGNOSIS — N60.99 ATYPICAL DUCTAL HYPERPLASIA OF BREAST: ICD-10-CM

## 2024-11-06 DIAGNOSIS — Z91.89 AT HIGH RISK FOR BREAST CANCER: ICD-10-CM

## 2024-11-06 DIAGNOSIS — Z12.31 ENCOUNTER FOR SCREENING MAMMOGRAM FOR MALIGNANT NEOPLASM OF BREAST: ICD-10-CM

## 2024-11-06 DIAGNOSIS — Z12.39 BREAST CANCER SCREENING, HIGH RISK PATIENT: Primary | ICD-10-CM

## 2024-11-06 DIAGNOSIS — Z98.890 S/P BILATERAL BREAST REDUCTION: ICD-10-CM

## 2024-11-06 DIAGNOSIS — Z86.03 PERSONAL HISTORY OF NEOPLASM OF UNCERTAIN BEHAVIOR: ICD-10-CM

## 2024-11-06 DIAGNOSIS — Z98.82 PRESENCE OF RIGHT BREAST IMPLANT: ICD-10-CM

## 2024-11-06 PROCEDURE — 99214 OFFICE O/P EST MOD 30 MIN: CPT | Performed by: SURGERY

## 2024-11-06 PROCEDURE — 99211 OFF/OP EST MAY X REQ PHY/QHP: CPT

## 2024-11-06 ASSESSMENT — PATIENT HEALTH QUESTIONNAIRE - PHQ9
1. LITTLE INTEREST OR PLEASURE IN DOING THINGS: SEVERAL DAYS
SUM OF ALL RESPONSES TO PHQ9 QUESTIONS 1 AND 2: 1
2. FEELING DOWN, DEPRESSED OR HOPELESS: NOT AT ALL
CLINICAL INTERPRETATION OF PHQ2 SCORE: NO FURTHER SCREENING NEEDED
SUM OF ALL RESPONSES TO PHQ9 QUESTIONS 1 AND 2: 1

## 2024-11-06 ASSESSMENT — PAIN SCALES - GENERAL: PAINLEVEL: 2

## 2024-12-23 ENCOUNTER — TELEPHONE (OUTPATIENT)
Dept: SURGERY | Age: 64
End: 2024-12-23

## 2024-12-25 ENCOUNTER — WALK IN (OUTPATIENT)
Dept: URGENT CARE | Age: 64
End: 2024-12-25
Attending: EMERGENCY MEDICINE

## 2024-12-25 ENCOUNTER — HOSPITAL ENCOUNTER (OUTPATIENT)
Dept: GENERAL RADIOLOGY | Age: 64
Discharge: HOME | End: 2024-12-25
Attending: EMERGENCY MEDICINE

## 2024-12-25 VITALS
HEART RATE: 110 BPM | BODY MASS INDEX: 21.29 KG/M2 | TEMPERATURE: 98.1 F | OXYGEN SATURATION: 96 % | WEIGHT: 140 LBS | RESPIRATION RATE: 16 BRPM | DIASTOLIC BLOOD PRESSURE: 83 MMHG | SYSTOLIC BLOOD PRESSURE: 137 MMHG

## 2024-12-25 DIAGNOSIS — J01.00 ACUTE NON-RECURRENT MAXILLARY SINUSITIS: ICD-10-CM

## 2024-12-25 DIAGNOSIS — R05.1 ACUTE COUGH: ICD-10-CM

## 2024-12-25 DIAGNOSIS — R05.1 ACUTE COUGH: Primary | ICD-10-CM

## 2024-12-25 PROCEDURE — 71046 X-RAY EXAM CHEST 2 VIEWS: CPT

## 2024-12-25 RX ORDER — BENZONATATE 200 MG/1
200 CAPSULE ORAL 3 TIMES DAILY PRN
Qty: 20 CAPSULE | Refills: 0 | Status: SHIPPED | OUTPATIENT
Start: 2024-12-25 | End: 2025-01-01

## 2024-12-25 ASSESSMENT — PAIN SCALES - GENERAL
PAINLEVEL_OUTOF10: 6
PAINLEVEL: 6

## 2025-02-26 ENCOUNTER — HOSPITAL ENCOUNTER (OUTPATIENT)
Dept: CT IMAGING | Age: 65
Discharge: HOME | End: 2025-02-26
Attending: PHYSICIAN ASSISTANT

## 2025-02-26 ENCOUNTER — TELEPHONE (OUTPATIENT)
Dept: SURGERY | Age: 65
End: 2025-02-26

## 2025-02-26 DIAGNOSIS — Z91.89 AT HIGH RISK FOR BREAST CANCER: ICD-10-CM

## 2025-02-26 DIAGNOSIS — Z12.31 ENCOUNTER FOR SCREENING MAMMOGRAM FOR MALIGNANT NEOPLASM OF BREAST: ICD-10-CM

## 2025-02-26 DIAGNOSIS — Z98.82 PRESENCE OF RIGHT BREAST IMPLANT: ICD-10-CM

## 2025-02-26 DIAGNOSIS — Z12.39 BREAST CANCER SCREENING, HIGH RISK PATIENT: ICD-10-CM

## 2025-02-26 DIAGNOSIS — N60.99 ATYPICAL DUCTAL HYPERPLASIA OF BREAST: ICD-10-CM

## 2025-02-26 DIAGNOSIS — Z98.890 S/P BILATERAL BREAST REDUCTION: ICD-10-CM

## 2025-02-26 PROCEDURE — 77063 BREAST TOMOSYNTHESIS BI: CPT

## 2025-02-27 ENCOUNTER — CLINICAL DOCUMENTATION (OUTPATIENT)
Dept: CT IMAGING | Age: 65
End: 2025-02-27

## 2025-03-05 ENCOUNTER — TELEPHONE (OUTPATIENT)
Dept: SURGERY | Age: 65
End: 2025-03-05

## 2025-03-05 ENCOUNTER — HOSPITAL ENCOUNTER (OUTPATIENT)
Dept: CT IMAGING | Age: 65
Discharge: HOME | End: 2025-03-05
Attending: PHYSICIAN ASSISTANT

## 2025-03-05 DIAGNOSIS — Z98.82 PRESENCE OF RIGHT BREAST IMPLANT: ICD-10-CM

## 2025-03-05 DIAGNOSIS — N60.99 ATYPICAL DUCTAL HYPERPLASIA OF BREAST: ICD-10-CM

## 2025-03-05 DIAGNOSIS — R92.8 ABNORMAL MAMMOGRAM: ICD-10-CM

## 2025-03-05 DIAGNOSIS — R92.8 ABNORMAL FINDINGS ON DIAGNOSTIC IMAGING OF BREAST: Primary | ICD-10-CM

## 2025-03-05 DIAGNOSIS — Z91.89 AT HIGH RISK FOR BREAST CANCER: ICD-10-CM

## 2025-03-05 DIAGNOSIS — Z12.39 BREAST CANCER SCREENING, HIGH RISK PATIENT: ICD-10-CM

## 2025-03-05 PROCEDURE — 76642 ULTRASOUND BREAST LIMITED: CPT

## 2025-03-05 PROCEDURE — G0279 TOMOSYNTHESIS, MAMMO: HCPCS

## 2025-05-07 ENCOUNTER — LAB SERVICES (OUTPATIENT)
Dept: LAB | Age: 65
End: 2025-05-07

## 2025-05-07 DIAGNOSIS — Z13.220 SCREENING FOR LIPOID DISORDERS: ICD-10-CM

## 2025-05-07 DIAGNOSIS — Z13.0 SCREENING FOR DISORDER OF BLOOD AND BLOOD-FORMING ORGANS: ICD-10-CM

## 2025-05-07 DIAGNOSIS — Z13.228 SCREENING FOR METABOLIC DISORDER: ICD-10-CM

## 2025-05-07 PROBLEM — N81.6 RECTOCELE: Status: ACTIVE | Noted: 2025-05-07

## 2025-05-07 LAB
ALBUMIN SERPL-MCNC: 4.1 G/DL (ref 3.4–5)
ALBUMIN/GLOB SERPL: 1.1 {RATIO} (ref 1–2.4)
ALP SERPL-CCNC: 59 UNITS/L (ref 45–117)
ALT SERPL-CCNC: 31 UNITS/L
ANION GAP SERPL CALC-SCNC: 9 MMOL/L (ref 7–19)
AST SERPL-CCNC: 24 UNITS/L
BASOPHILS # BLD: 0.1 K/MCL (ref 0–0.3)
BASOPHILS NFR BLD: 1 %
BILIRUB SERPL-MCNC: 0.6 MG/DL (ref 0.2–1)
BUN SERPL-MCNC: 16 MG/DL (ref 6–20)
BUN/CREAT SERPL: 27 (ref 7–25)
CALCIUM SERPL-MCNC: 10 MG/DL (ref 8.4–10.2)
CHLORIDE SERPL-SCNC: 104 MMOL/L (ref 97–110)
CHOLEST SERPL-MCNC: 206 MG/DL
CHOLEST/HDLC SERPL: 2.9 {RATIO}
CO2 SERPL-SCNC: 28 MMOL/L (ref 21–32)
CREAT SERPL-MCNC: 0.59 MG/DL (ref 0.51–0.95)
DEPRECATED RDW RBC: 43 FL (ref 39–50)
EGFRCR SERPLBLD CKD-EPI 2021: >90 ML/MIN/{1.73_M2}
EOSINOPHIL # BLD: 0.2 K/MCL (ref 0–0.5)
EOSINOPHIL NFR BLD: 2 %
ERYTHROCYTE [DISTWIDTH] IN BLOOD: 13.3 % (ref 11–15)
FASTING DURATION TIME PATIENT: ABNORMAL H
GLOBULIN SER-MCNC: 3.8 G/DL (ref 2–4)
GLUCOSE SERPL-MCNC: 98 MG/DL (ref 70–99)
HCT VFR BLD CALC: 44.5 % (ref 36–46.5)
HDLC SERPL-MCNC: 72 MG/DL
HGB BLD-MCNC: 14.3 G/DL (ref 12–15.5)
IMM GRANULOCYTES # BLD AUTO: 0 K/MCL (ref 0–0.2)
IMM GRANULOCYTES # BLD: 0 %
LDLC SERPL CALC-MCNC: 105 MG/DL
LYMPHOCYTES # BLD: 2.8 K/MCL (ref 1–4)
LYMPHOCYTES NFR BLD: 28 %
MCH RBC QN AUTO: 28.5 PG (ref 26–34)
MCHC RBC AUTO-ENTMCNC: 32.1 G/DL (ref 32–36.5)
MCV RBC AUTO: 88.6 FL (ref 78–100)
MONOCYTES # BLD: 0.7 K/MCL (ref 0.3–0.9)
MONOCYTES NFR BLD: 7 %
NEUTROPHILS # BLD: 6.4 K/MCL (ref 1.8–7.7)
NEUTROPHILS NFR BLD: 62 %
NONHDLC SERPL-MCNC: 134 MG/DL
NRBC BLD MANUAL-RTO: 0 /100 WBC
PLATELET # BLD AUTO: 330 K/MCL (ref 140–450)
POTASSIUM SERPL-SCNC: 4.8 MMOL/L (ref 3.4–5.1)
PROT SERPL-MCNC: 7.9 G/DL (ref 6.4–8.2)
RBC # BLD: 5.02 MIL/MCL (ref 4–5.2)
SODIUM SERPL-SCNC: 136 MMOL/L (ref 135–145)
TRIGL SERPL-MCNC: 147 MG/DL
WBC # BLD: 10.1 K/MCL (ref 4.2–11)

## 2025-05-07 PROCEDURE — 80061 LIPID PANEL: CPT | Performed by: CLINICAL MEDICAL LABORATORY

## 2025-05-07 PROCEDURE — 80053 COMPREHEN METABOLIC PANEL: CPT | Performed by: CLINICAL MEDICAL LABORATORY

## 2025-05-07 PROCEDURE — 85025 COMPLETE CBC W/AUTO DIFF WBC: CPT | Performed by: CLINICAL MEDICAL LABORATORY

## 2025-05-07 PROCEDURE — 36415 COLL VENOUS BLD VENIPUNCTURE: CPT | Performed by: CLINICAL MEDICAL LABORATORY

## (undated) DEVICE — Device

## (undated) DEVICE — PROXIMATE SKIN STAPLERS (35 WIDE) CONTAINS 35 STAINLESS STEEL STAPLES (FIXED HEAD): Brand: PROXIMATE

## (undated) DEVICE — GLOVE SURG 7.5 PROTEXIS LF CRM PF SMTH BEAD CUFF STRL

## (undated) DEVICE — GLOVE SURG 7 PROTEXIS LF CRM PF BEAD CUFF STRL PLISPRN 12IN

## (undated) DEVICE — GLOVE SURG 6.5 PROTEXIS LF CRM PF BEAD CUFF STRL PLISPRN

## (undated) DEVICE — SYRINGE 10ML CNTRL CONC TIP PYROGEN FREE DEHP-FR STRL MED LF

## (undated) DEVICE — 12 ML SYRINGE LUER-LOCK TIP: Brand: MONOJECT

## (undated) DEVICE — GOWN SURG LG L3 NONREINFORCE SET IN SLV STRL LF DISP BLUE

## (undated) DEVICE — OINTMENT ANBTC CRD LF

## (undated) DEVICE — ELECTRODE ES L2.75IN XLN STD BLDE MOD E-Z CLN

## (undated) DEVICE — CONTAINER SPEC 4OZ 2.5X2.75IN OR POS INDCTR TMPR EVD LEAK

## (undated) DEVICE — 3M™ STERI-STRIP™ REINFORCED ADHESIVE SKIN CLOSURES, R1547, 1/2 IN X 4 IN (12 MM X 100 MM), 6 STRIPS/ENVELOPE: Brand: 3M™ STERI-STRIP™

## (undated) DEVICE — GAMMEX® PI HYBRID SIZE 6.5, STERILE POWDER-FREE SURGICAL GLOVE, POLYISOPRENE AND NEOPRENE BLEND: Brand: GAMMEX

## (undated) DEVICE — ELECTRODE ESURG BLADE 2.5IN 1.1IN 332IN STD SHAFT HEX LOCK

## (undated) DEVICE — NEEDLE HPO 25GA 1.5IN REG WALL REG BVL LL HUB DEHP-FR STRL

## (undated) DEVICE — POUCH INST LONG 18X10IN 2 ADH STRIP 3 CMPRT STRL STRDRP

## (undated) DEVICE — GLOVE SURG 6.5 DRMPRN ULTRA LF DARK GRN PF SMTH BEAD CUFF

## (undated) DEVICE — GOWN SURG L DISP LEV 3 AERO BLU RAGLAN SL ST

## (undated) DEVICE — SPONGE GAUZE 6.75X6IN CTN ABS STRL LF BLK2

## (undated) DEVICE — DRAPE .75 SHT FNFLD 76X52IN SURG CNVRT STRL LF DISP TIBURON

## (undated) DEVICE — BANDAGE,GAUZE,BULKEE II,4.5"X4.1YD,STRL: Brand: MEDLINE

## (undated) DEVICE — STRIP 5X1IN STRSTRP POLY REINFORCE SKNCLS WHT STRL LF

## (undated) DEVICE — ELECTRODE ESURG BLADE 4IN .2IN 332IN INSULATE STD SHAFT XTD

## (undated) DEVICE — SOLUTION PREP 26ML 0.7% POVACRYLEX 74% ISO

## (undated) DEVICE — VIOLET BRAIDED (POLYGLACTIN 910), SYNTHETIC ABSORBABLE SUTURE: Brand: COATED VICRYL

## (undated) DEVICE — BULB 100CC SIL DRN LTWT LOW LVL SCT WND DRN STRL LF DISP

## (undated) DEVICE — DRAPE 2 INCS FILM ANTIMICROBIAL 23X17IN SURG IOBAN STRL

## (undated) DEVICE — Device: Brand: JELCO

## (undated) DEVICE — LIGHT WIDE FLAT RETRACTOR PHOTONGUIDE STRL LF DISP FBROP

## (undated) DEVICE — DRESSING GRMCDL ANTIMICROBIAL ADH CNTR HOLE SLIT BPTCH CHG

## (undated) DEVICE — SUTURE PDS II SZ 2-0 L27IN ABSRB VLT SH L26MM

## (undated) DEVICE — HANDPIECE SCT MDVC FLX-CLR HI CAPACITY FLXB CLR STRL LF DISP

## (undated) DEVICE — APPLICATOR SWAB L6IN GEN PURP INDIVIDUALLY .

## (undated) DEVICE — SUCTION CANISTER, 3000CC,SAFELINER: Brand: DEROYAL

## (undated) DEVICE — GOWN SURG XL L3 NONREINFORCE SET IN SLV STRL LF DISP BLUE

## (undated) DEVICE — EVACUATOR SUR 100CC SIL BLB WND

## (undated) DEVICE — ELECTRODE ESURG BLADE 6.5IN MNPLR NONSTICK XTD MEGADYNE EZ

## (undated) DEVICE — MAJOR GENERAL: Brand: MEDLINE INDUSTRIES, INC.

## (undated) DEVICE — FUNNEL GFT KELLER FNL 2 PLMR HYDROPHILIC TRANS CONE SLV FILM

## (undated) DEVICE — 2% CHLORHEXIDINE SKIN PREP ORANGE 26ML

## (undated) DEVICE — BIOPATCH™ ANTIMICROBIAL DRESSING WITH CHLORHEXIDINE GLUCONATE IS A HYDROPHILLIC POLYURETHANE ABSORPTIVE FOAM WITH CHLORHEXIDINE GLUCONATE (CHG) WHICH INHIBITS BACTERIAL GROWTH UNDER THE DRESSING. THE DRESSING IS INTENDED TO BE USED TO ABSORB EXUDATE, COVER A WOUND CAUSED BY VASCULAR AND NONVASCULAR PERCUTANEOUS MEDICAL DEVICES DURING SURGERY, AS WELL AS REDUCE LOCAL INFECTION AND COLONIZATION OF MICROORGANISMS.: Brand: BIOPATCH

## (undated) DEVICE — SUTURE PROLENE MTPS 6-0 P-3 18IN MONO NABSB BLUE 8695G

## (undated) DEVICE — E-Z CLEAN, NON-STICK, PTFE COATED, ELECTROSURGICAL BLADE ELECTRODE, MODIFIED EXTENDED INSULATION, 6.5 INCH (16.5 CM): Brand: MEGADYNE

## (undated) DEVICE — WET SKIN PREP TRAY 1 EA THREE COMPARTMENT TRAY 2

## (undated) DEVICE — KIT INFUS PMP 270ML 4ML/HR 2ML/SITE SOAK CATH .

## (undated) DEVICE — ELECTRODE PT RTN C30- LB 9FT CORD NONIRRITATE NONSENSITIZE

## (undated) DEVICE — STAPLER SKIN 3.9X6.9MM WIDE RECT 35 CNT ROTATE HEAD RCHT

## (undated) DEVICE — POUCH INST 11X7IN 2 ADH STRIP 2 CMPRT STRL STRDRP PLASTIC

## (undated) DEVICE — SPONGE LAP W18XL18IN WHT COT 4 PLY FLD STRUNG

## (undated) DEVICE — APPLICATOR BNZN TNCT .6ML STRSTRP SKNCLS NONHYPOALLERGENIC

## (undated) DEVICE — SUTURE PDS2 2-0 CT1 27IN MONO ABS VIOL

## (undated) DEVICE — SUTURE VCRL SZ 2-0 L27IN ABSRB UD L24MM FS-1

## (undated) DEVICE — ADHESIVE LIQ 2/3ML VI MASTISOL

## (undated) DEVICE — BLADE SURG 10 INDIV FOIL WRAP STD SCPL HNDL STRL PRSNA + SS

## (undated) DEVICE — DRAIN SURG W7MMXL20CM SIL HUBLESS FLAT FLL

## (undated) DEVICE — SPONGE GAUZE 4X4IN CTN 8 PLY WOVEN FOLD EDGE TYPE 7 STRL LF

## (undated) DEVICE — SOLUTION IRRIG 1000ML 0.9% NACL USP BTL

## (undated) DEVICE — SUT VCRL SZ 3-0 L18IN ABSRB UD PS-2 L19MM

## (undated) DEVICE — TOWEL OR BLU 16X26IN 4PK

## (undated) DEVICE — SUTURE ETHILON 2-0 FS 18IN MONO NABSB BLK 664H

## (undated) DEVICE — DRESSING PETRO 3X3IN NADH NONOCCLUSIVE IMPREGNATE KNIT CRD

## (undated) DEVICE — SUTURE MONOCRYL MTPS 4-0 PS2 18IN MONO ABS UNDYED

## (undated) DEVICE — NEEDLE HPO 16GA 1.5IN REG WALL REG BVL LL HUB DEHP-FR STRL

## (undated) DEVICE — BLADE SURG 15 STRL PRSNA + PLMR

## (undated) DEVICE — DRESSING TRANS 2.75INX2 38IN ADH HPOAL WTPRF TEGADERM PU

## (undated) DEVICE — SUTURE PDS2 MTPS 3-0 PS2 18IN MONO ABS UNDYED

## (undated) DEVICE — CABLE BPLR L12FT FLYING LD DISP

## (undated) DEVICE — SUTURE PROL SZ 4-0 L18IN NONABSORB BLU

## (undated) DEVICE — DRESSING TRANS 4.75X4IN ADH HPOAL WTPRF TEGADERM PU STD STRL

## (undated) DEVICE — SOLUTION IRR 1000ML 0.9% NACL PLASTIC POUR BTL ISTNC N-PYRG

## (undated) DEVICE — BRA SURG ELIZ PINK M

## (undated) DEVICE — GLOVE SURG 7.5 PROTEXIS LF BLUE PF SMTH BEAD CUFF INTLK STRL

## (undated) DEVICE — DRAIN INCS 19FR 316IN .75 FLUTE RND .25IN BARD SIL 4 FREE

## (undated) DEVICE — BLADE SS #10 STER

## (undated) DEVICE — SYRINGE 30ML CONC TIP GRAD N-PYRG DEHP-FR STRL MED LF DISP